# Patient Record
Sex: FEMALE | Race: WHITE | NOT HISPANIC OR LATINO | Employment: UNEMPLOYED | URBAN - METROPOLITAN AREA
[De-identification: names, ages, dates, MRNs, and addresses within clinical notes are randomized per-mention and may not be internally consistent; named-entity substitution may affect disease eponyms.]

---

## 2023-05-09 PROBLEM — B02.9 SHINGLES: Status: RESOLVED | Noted: 2023-05-09 | Resolved: 2023-05-09

## 2023-05-09 PROBLEM — G47.00 INSOMNIA: Status: ACTIVE | Noted: 2023-05-09

## 2023-05-09 PROBLEM — B02.9 HERPES ZOSTER WITHOUT COMPLICATION: Status: RESOLVED | Noted: 2023-05-09 | Resolved: 2023-05-09

## 2023-05-09 PROBLEM — N39.0 RECURRENT POSTCOITAL URINARY TRACT INFECTION: Status: ACTIVE | Noted: 2023-05-09

## 2023-05-09 PROBLEM — D72.819 LEUKOPENIA: Status: ACTIVE | Noted: 2023-05-09

## 2023-05-09 PROBLEM — N83.209 OVARIAN CYST: Status: ACTIVE | Noted: 2023-05-09

## 2023-05-09 PROBLEM — J34.89 NASAL OBSTRUCTION: Status: ACTIVE | Noted: 2023-05-09

## 2023-05-09 PROBLEM — E03.9 HYPOTHYROIDISM: Status: ACTIVE | Noted: 2023-05-09

## 2023-05-09 PROBLEM — N63.0 LUMP OR MASS IN BREAST: Status: ACTIVE | Noted: 2023-05-09

## 2023-05-09 PROBLEM — E78.5 HYPERLIPIDEMIA: Status: ACTIVE | Noted: 2023-05-09

## 2023-05-09 PROBLEM — B00.1 COLD SORE: Status: ACTIVE | Noted: 2023-05-09

## 2023-05-09 PROBLEM — J35.8 TONSIL STONE: Status: ACTIVE | Noted: 2023-05-09

## 2023-05-09 PROBLEM — J34.2 DEVIATED SEPTUM: Status: ACTIVE | Noted: 2023-05-09

## 2023-05-09 PROBLEM — D64.9 ANEMIA: Status: ACTIVE | Noted: 2023-05-09

## 2023-05-09 PROBLEM — M25.579 ANKLE JOINT PAIN: Status: ACTIVE | Noted: 2023-05-09

## 2023-05-09 PROBLEM — J32.9 CHRONIC SINUSITIS: Status: ACTIVE | Noted: 2023-05-09

## 2023-05-09 RX ORDER — FLUTICASONE PROPIONATE 50 MCG
SPRAY, SUSPENSION (ML) NASAL
COMMUNITY
Start: 2018-03-21

## 2023-05-09 RX ORDER — ZOLPIDEM TARTRATE 5 MG/1
1 TABLET ORAL NIGHTLY PRN
COMMUNITY
Start: 2021-01-20 | End: 2023-05-10 | Stop reason: SDUPTHER

## 2023-05-10 ENCOUNTER — OFFICE VISIT (OUTPATIENT)
Dept: PRIMARY CARE | Facility: CLINIC | Age: 63
End: 2023-05-10
Payer: COMMERCIAL

## 2023-05-10 VITALS
WEIGHT: 135 LBS | SYSTOLIC BLOOD PRESSURE: 130 MMHG | HEART RATE: 70 BPM | RESPIRATION RATE: 14 BRPM | BODY MASS INDEX: 21.19 KG/M2 | DIASTOLIC BLOOD PRESSURE: 70 MMHG | HEIGHT: 67 IN | TEMPERATURE: 97.8 F

## 2023-05-10 DIAGNOSIS — R91.1 LUNG NODULE: ICD-10-CM

## 2023-05-10 DIAGNOSIS — G47.00 INSOMNIA, UNSPECIFIED TYPE: ICD-10-CM

## 2023-05-10 DIAGNOSIS — Z00.00 PERIODIC HEALTH ASSESSMENT, GENERAL SCREENING, ADULT: Primary | ICD-10-CM

## 2023-05-10 DIAGNOSIS — E03.9 HYPOTHYROIDISM, UNSPECIFIED TYPE: ICD-10-CM

## 2023-05-10 DIAGNOSIS — E78.5 HYPERLIPIDEMIA, UNSPECIFIED HYPERLIPIDEMIA TYPE: ICD-10-CM

## 2023-05-10 PROBLEM — D72.819 LEUKOPENIA: Status: RESOLVED | Noted: 2023-05-09 | Resolved: 2023-05-10

## 2023-05-10 PROCEDURE — 99396 PREV VISIT EST AGE 40-64: CPT | Performed by: INTERNAL MEDICINE

## 2023-05-10 PROCEDURE — 1036F TOBACCO NON-USER: CPT | Performed by: INTERNAL MEDICINE

## 2023-05-10 RX ORDER — ZOLPIDEM TARTRATE 5 MG/1
5 TABLET ORAL NIGHTLY PRN
Qty: 5 TABLET | Refills: 0 | Status: SHIPPED | OUTPATIENT
Start: 2023-05-10 | End: 2023-07-21 | Stop reason: SDUPTHER

## 2023-05-10 ASSESSMENT — ENCOUNTER SYMPTOMS
PALPITATIONS: 0
SHORTNESS OF BREATH: 0
COUGH: 0
CONSTIPATION: 0
WHEEZING: 0
ABDOMINAL PAIN: 0

## 2023-05-10 NOTE — PROGRESS NOTES
"Subjective   Patient ID: Savanna Shah is a 63 y.o. female who presents for Annual Exam (Physical).  She is super active.  No issues with CP,SOB or dizzy spells.  She does follow with Gyn.  She states she is up to date with colonoscopy - through [facility].  No issues or changes with bowel or bladder habits.    We reviewed her previous CT scans as well.  She has never been a smoker and has never been exposed to nay known carcinogens.      Review of Systems   Respiratory:  Negative for cough, shortness of breath and wheezing.    Cardiovascular:  Negative for chest pain and palpitations.   Gastrointestinal:  Negative for abdominal pain and constipation.       Objective   /70 (BP Location: Left arm, Patient Position: Sitting, BP Cuff Size: Adult)   Pulse 70   Temp 36.6 °C (97.8 °F) (Tympanic)   Resp 14   Ht 1.702 m (5' 7\")   Wt 61.2 kg (135 lb)   BMI 21.14 kg/m²     Physical Exam    Assessment/Plan   Problem List Items Addressed This Visit          Nervous    Insomnia    Relevant Medications    zolpidem (Ambien) 5 mg tablet       Endocrine/Metabolic    Hypothyroidism       Other    Hyperlipidemia     Other Visit Diagnoses       Periodic health assessment, general screening, adult    -  Primary    Relevant Orders    CBC    Comprehensive Metabolic Panel    Lipid Panel    Thyroid Stimulating Hormone    Vitamin D, Total    Lung nodule            I personally reviewed the OARRS report for this patient.  The report has been automatically uploaded into the EMR.  I have considered the risk of abuse, dependence, addiction and diversion.     She only uses the ambien when traveling - this is not a regularly used substance.    Physical exam is unremarkable.  We reviewed and discussed all the above.  We discussed current medications as well as most recent test results.  We discussed the importance and benefits of a healthy diet that is both low in sugars and low in saturated fats.  We reviewed and discussed the " benefits of regular physical exercise. We also discussed the importance of stress management and good sleep hygiene which she says she has no issues with.    We will continue to work on lifestyle improvements and follow-up in 6 to 12 months, sooner if any issues should arise.

## 2023-07-21 DIAGNOSIS — G47.00 INSOMNIA, UNSPECIFIED TYPE: ICD-10-CM

## 2023-07-21 RX ORDER — ZOLPIDEM TARTRATE 5 MG/1
5 TABLET ORAL NIGHTLY PRN
Qty: 30 TABLET | Refills: 0 | Status: SHIPPED | OUTPATIENT
Start: 2023-07-21 | End: 2023-12-26 | Stop reason: SDUPTHER

## 2023-10-23 ENCOUNTER — APPOINTMENT (OUTPATIENT)
Dept: CARDIOLOGY | Facility: CLINIC | Age: 63
End: 2023-10-23
Payer: COMMERCIAL

## 2023-12-26 DIAGNOSIS — G47.00 INSOMNIA, UNSPECIFIED TYPE: ICD-10-CM

## 2023-12-26 RX ORDER — ZOLPIDEM TARTRATE 5 MG/1
5 TABLET ORAL NIGHTLY PRN
Qty: 30 TABLET | Refills: 0 | OUTPATIENT
Start: 2023-12-26 | End: 2024-01-25

## 2023-12-26 NOTE — TELEPHONE ENCOUNTER
----- Message from Mary Castillo CMA sent at 12/26/2023  8:20 AM EST -----  Regarding: FW: Ambient   Contact: [phone]    ----- Message -----  From: Savanna Shah  Sent: 12/26/2023   7:46 AM EST  To: [id number]  Subject: Ambient                                          I need a 30 day refill on ambient. I only use when traveling for business. Which has been heavy lately. Thank you.

## 2023-12-26 NOTE — TELEPHONE ENCOUNTER
----- Message from Mary Castillo CMA sent at 12/26/2023  8:20 AM EST -----  Regarding: FW: Ambient   Contact: [phone]    ----- Message -----  From: Savanna Shah  Sent: 12/26/2023   7:46 AM EST  To: [id number]  Subject: Ambient                                          I need a 30 day refill on ambient. I only use when traveling for business. Which has been heavy lately. Thank you.

## 2023-12-26 NOTE — TELEPHONE ENCOUNTER
Last seen 5/2023, Sent a message for patient to call the office to schedule an appointment. She is aware that you are out of the office.

## 2023-12-28 RX ORDER — ZOLPIDEM TARTRATE 5 MG/1
5 TABLET ORAL NIGHTLY PRN
Qty: 30 TABLET | Refills: 3 | Status: SHIPPED | OUTPATIENT
Start: 2023-12-28 | End: 2024-01-15 | Stop reason: SDUPTHER

## 2024-01-15 ENCOUNTER — TELEPHONE (OUTPATIENT)
Dept: PRIMARY CARE | Facility: CLINIC | Age: 64
End: 2024-01-15
Payer: COMMERCIAL

## 2024-01-15 DIAGNOSIS — G47.00 INSOMNIA, UNSPECIFIED TYPE: ICD-10-CM

## 2024-01-15 RX ORDER — ZOLPIDEM TARTRATE 5 MG/1
5 TABLET ORAL NIGHTLY PRN
Qty: 30 TABLET | Refills: 3 | Status: SHIPPED | OUTPATIENT
Start: 2024-01-15 | End: 2024-05-14

## 2024-01-15 NOTE — TELEPHONE ENCOUNTER
Pt left  requesting Ambien rf.  Stated she has called twice for rf to [facility].  Was told by [facility] rf was denied. Requesting call back.

## 2024-02-06 DIAGNOSIS — M65.312 TRIGGER FINGER OF LEFT THUMB: Primary | ICD-10-CM

## 2024-03-01 RX ORDER — BISMUTH SUBSALICYLATE 262 MG
1 TABLET,CHEWABLE ORAL DAILY
COMMUNITY

## 2024-03-01 RX ORDER — CHOLECALCIFEROL (VITAMIN D3) 25 MCG
1000 TABLET ORAL DAILY
COMMUNITY

## 2024-03-01 ASSESSMENT — DUKE ACTIVITY SCORE INDEX (DASI)
CAN YOU DO MODERATE WORK AROUND THE HOUSE LIKE VACUUMING, SWEEPING FLOORS OR CARRYING GROCERIES: YES
CAN YOU WALK INDOORS, SUCH AS AROUND YOUR HOUSE: YES
CAN YOU PARTICIPATE IN MODERATE RECREATIONAL ACTIVITIES LIKE GOLF, BOWLING, DANCING, DOUBLES TENNIS OR THROWING A BASEBALL OR FOOTBALL: YES
CAN YOU PARTICIPATE IN STRENOUS SPORTS LIKE SWIMMING, SINGLES TENNIS, FOOTBALL, BASKETBALL, OR SKIING: YES
CAN YOU CLIMB A FLIGHT OF STAIRS OR WALK UP A HILL: YES
CAN YOU WALK A BLOCK OR TWO ON LEVEL GROUND: YES
CAN YOU DO HEAVY WORK AROUND THE HOUSE LIKE SCRUBBING FLOORS OR LIFTING AND MOVING HEAVY FURNITURE: YES
CAN YOU DO YARD WORK LIKE RAKING LEAVES, WEEDING OR PUSHING A MOWER: YES
CAN YOU DO LIGHT WORK AROUND THE HOUSE LIKE DUSTING OR WASHING DISHES: YES
CAN YOU HAVE SEXUAL RELATIONS: YES
CAN YOU TAKE CARE OF YOURSELF (EAT, DRESS, BATHE, OR USE TOILET): YES
TOTAL_SCORE: 58.2
DASI METS SCORE: 9.9
CAN YOU RUN A SHORT DISTANCE: YES

## 2024-03-01 ASSESSMENT — LIFESTYLE VARIABLES: SMOKING_STATUS: NONSMOKER

## 2024-03-01 ASSESSMENT — CHADS2 SCORE
CHADS2 SCORE: 0
AGE GREATER THAN OR EQUAL TO 75: NO
DIABETES: NO
CHF: NO
PRIOR STROKE OR TIA OR THROMBOEMBOLISM: NO
HYPERTENSION: NO

## 2024-03-01 ASSESSMENT — ACTIVITIES OF DAILY LIVING (ADL): ADL_SCORE: 0

## 2024-03-04 ENCOUNTER — PRE-ADMISSION TESTING (OUTPATIENT)
Dept: PREADMISSION TESTING | Facility: HOSPITAL | Age: 64
End: 2024-03-04
Payer: MEDICARE

## 2024-03-04 VITALS
DIASTOLIC BLOOD PRESSURE: 77 MMHG | OXYGEN SATURATION: 98 % | TEMPERATURE: 97.3 F | HEART RATE: 55 BPM | WEIGHT: 136.91 LBS | BODY MASS INDEX: 22 KG/M2 | HEIGHT: 66 IN | RESPIRATION RATE: 16 BRPM | SYSTOLIC BLOOD PRESSURE: 113 MMHG

## 2024-03-04 DIAGNOSIS — M65.312 TRIGGER FINGER OF LEFT THUMB: ICD-10-CM

## 2024-03-04 DIAGNOSIS — Z01.818 PRE-OP TESTING: Primary | ICD-10-CM

## 2024-03-04 PROCEDURE — 99203 OFFICE O/P NEW LOW 30 MIN: CPT | Performed by: NURSE PRACTITIONER

## 2024-03-04 PROCEDURE — 93010 ELECTROCARDIOGRAM REPORT: CPT | Performed by: INTERNAL MEDICINE

## 2024-03-04 PROCEDURE — 93005 ELECTROCARDIOGRAM TRACING: CPT

## 2024-03-04 ASSESSMENT — PAIN SCALES - GENERAL: PAINLEVEL_OUTOF10: 0 - NO PAIN

## 2024-03-04 ASSESSMENT — PAIN - FUNCTIONAL ASSESSMENT: PAIN_FUNCTIONAL_ASSESSMENT: 0-10

## 2024-03-04 NOTE — CPM/PAT H&P
"CPM/PAT Evaluation       Name: Savanna Shah (Savanna Shah)  /Age: 1960/63 y.o.     In-Person       Chief Complaint: left thumb pains    HPI    LH is a 64 yo female who has had left thumb pains with limited ROM- \"locking\" discomforts are often. She wears a splint for comfort at times. Denies any recent steroid injections. It has been determined to be left thumb trigger finger so subsequently scheduled for left thumb soft tissue release. Skin intact to surgical hand. Otherwise denies any recent illness, fever/chills, chest pains or shortness of breath.     Past Medical History:   Diagnosis Date    Insect bite (nonvenomous), unspecified lower leg, initial encounter (CODE) 10/09/2019    Insect bite of lower leg, unspecified laterality, initial encounter    Melanoma (CMS/HCC)     Trigger finger      PCP: Dr. Watson    Past Surgical History:   Procedure Laterality Date    OTHER SURGICAL HISTORY  2022    Dilation and curettage    OTHER SURGICAL HISTORY  2022    Complete colonoscopy    THUMB ARTHROSCOPY Right        Patient  reports being sexually active.    Family History   Problem Relation Name Age of Onset    Heart attack Father      Diabetes Sister      Other (CABG) Brother         Allergies   Allergen Reactions    Penicillins Unknown       Prior to Admission medications    Medication Sig Start Date End Date Taking? Authorizing Provider   cholecalciferol (Vitamin D3) 25 MCG (1000 UT) tablet Take 1 tablet (1,000 Units) by mouth once daily.    Historical Provider, MD   fluticasone (Flonase) 50 mcg/actuation nasal spray Administer into affected nostril(s). 3/21/18   Historical Provider, MD   mecobalamin (B12 ACTIVE ORAL) Take 1 capsule by mouth once daily.    Historical Provider, MD   multivitamin tablet Take 1 tablet by mouth once daily.    Historical Provider, MD   zolpidem (Ambien) 5 mg tablet Take 1 tablet (5 mg) by mouth as needed at bedtime for sleep. 1/15/24 5/14/24  Gregg Watson, " DO        PAT ROS   Constitutional: Negative for fever, chills, or sweats   ENMT: Negative for nasal discharge, congestion, ear pain, mouth pain, throat pain   Respiratory: Negative for cough, wheezing, shortness of breath   Cardiac: Negative for chest pain, dyspnea on exertion, palpitations   Gastrointestinal: Negative for nausea, vomiting, diarrhea, constipation, abdominal pain  Genitourinary: Negative for dysuria, flank pain, frequency, hematuria     Musculoskeletal: Positive for decreased ROM, pain, swelling, weakness in left thumb    Neurological: Negative for dizziness, confusion, headache  Psychiatric: Negative for mood changes   Skin: Negative for itching, rash, ulcer    Hematologic/Lymph: Negative for bruising, easy bleeding  Allergic/Immunologic: Negative itching, sneezing, swelling      Physical Exam  HENT:      Head: Normocephalic.      Mouth/Throat:      Mouth: Mucous membranes are moist.   Eyes:      Extraocular Movements: Extraocular movements intact.   Cardiovascular:      Rate and Rhythm: Normal rate and regular rhythm.   Pulmonary:      Effort: Pulmonary effort is normal.      Breath sounds: Normal breath sounds.   Abdominal:      General: Abdomen is flat.      Palpations: Abdomen is soft.   Musculoskeletal:         General: Normal range of motion.      Cervical back: Normal range of motion.      Comments: Left thumb limited ROM   Skin:     General: Skin is warm and dry.   Neurological:      General: No focal deficit present.      Mental Status: She is alert.   Psychiatric:         Mood and Affect: Mood normal.        PAT AIRWAY:   Airway:     Neck ROM::  Full  normal      Anesthesia:  Patient denies any anesthesia complications.     Visit Vitals  /77   Pulse 55   Temp 36.3 °C (97.3 °F) (Temporal)   Resp 16       DASI Risk Score      Flowsheet Row Most Recent Value   DASI SCORE 58.2   METS Score (Will be calculated only when all the questions are answered) 9.9          Caprini DVT Assessment     No data to display       Modified Frailty Index      Flowsheet Row Most Recent Value   Modified Frailty Index Calculator 0          CHADS2 Stroke Risk  Current as of 2 minutes ago        N/A 3 - 100%: High Risk   2 - 3%: Medium Risk   0 - 2%: Low Risk     Last Change: N/A          This score determines the patient's risk of having a stroke if the patient has atrial fibrillation.        This score is not applicable to this patient. Components are not calculated.          Revised Cardiac Risk Index    No data to display       Apfel Simplified Score    No data to display       Risk Analysis Index Results This Encounter         3/1/2024  1229             RAE Cancer History: Patient does not indicate history of cancer    Total Risk Analysis Index Score Without Cancer: 18    Total Risk Analysis Index Score: 18          Stop Bang Score      Flowsheet Row Most Recent Value   Do you snore loudly? 0   Do you often feel tired or fatigued after your sleep? 0   Has anyone ever observed you stop breathing in your sleep? 0   Do you have or are you being treated for high blood pressure? 0   Recent BMI (Calculated) 21.1   Is BMI greater than 35 kg/m2? 0=No   Age older than 50 years old? 1=Yes   Is your neck circumference greater than 17 inches (Male) or 16 inches (Female)? 0   Gender - Male 0=No   STOP-BANG Total Score 1            Assessment and Plan:     64 yo female scheduled for left upper extremity thumb soft tissue release on 3/18/2024 with Dr. Sánchez. EKG shows sinus bradycardia with v rate of 51 bpm. Otherwise no further orders indicated.     See risk scores as previously documented.

## 2024-03-04 NOTE — PREPROCEDURE INSTRUCTIONS
Medication List            Accurate as of March 4, 2024 11:24 AM. Always use your most recent med list.                B12 ACTIVE ORAL  Medication Adjustments for Surgery: Stop 7 days before surgery     fluticasone 50 mcg/actuation nasal spray  Commonly known as: Flonase  Medication Adjustments for Surgery: Other (Comment)  Notes to patient: May use the morning of surgery if needed     multivitamin tablet  Medication Adjustments for Surgery: Stop 7 days before surgery     Vitamin D3 25 MCG (1000 UT) tablet  Generic drug: cholecalciferol  Medication Adjustments for Surgery: Stop 7 days before surgery     zolpidem 5 mg tablet  Commonly known as: Ambien  Take 1 tablet (5 mg) by mouth as needed at bedtime for sleep.  Medication Adjustments for Surgery: Continue until night before surgery                  PRE-OPERATIVE INSTRUCTIONS    You will receive notification one business day prior to your procedure to confirm your arrival time. It is important that you answer your phone and/or check your messages during this time. If you do not hear from the surgery center by 5 pm. the day before your procedure, please call [phone].     Please enter the building through the Outpatient entrance and take the elevator off the lobby to the 2nd floor then check in at the Outpatient Surgery desk on the 2nd floor.    INSTRUCTIONS:  Talk to your surgeon for instructions if you should stop your aspirin, blood thinner, or diabetes medicines.  DO NOT take any multivitamins or over the counter supplements for 7-10 days before surgery.  If not being admitted, you must have an adult immediately available to drive you home after surgery. We also highly recommend you have someone stay with you for the entire day and night of your surgery.  For children having surgery, a parent or legal guardian must accompany them to the surgery center. If this is not possible, please call [phone] to make additional arrangements.  For adults who are  unable to consent or make medical decisions for themselves, a legal guardian or Power of  must accompany them to the surgery center. If this is not possible, please call [phone] to make additional arrangements.  Wear comfortable, loose fitting clothing.  All jewelry and piercings must be removed. If you are unable to remove an item or have a dermal piercing, please be sure to tell the nurse when you arrive for surgery.  Nail polish and make-up must be removed.  Avoid smoking or consuming alcohol for 24 hours before surgery.  To help prevent infection, please take a shower/bath and wash your hair the night before and/or morning of surgery (or follow other specific bathing instructions provided).    Preoperative Fasting Guidelines    Why must I stop eating and drinking near surgery time?  With sedation, food or liquid in your stomach can enter your lungs causing serious complications  Increases nausea and vomiting    When do I need to stop eating and drinking before my surgery?  Do not eat any solid food after midnight the night before your surgery/procedure.  You may have up to TEN ounces of clear liquid until TWO hours before your instructed arrival time to the hospital.  This includes water, black tea/coffee, (no milk or cream) apple juice, and electrolyte drinks (Gatorade).   You may chew gum until TWO hours before your surgery/procedure    If you have any questions or concerns, please call Pre-Admission Testing at [phone].       Home Preoperative Antibacterial Shower with Chlorhexidine gluconate (CHG)     What is a home preoperative antibacterial shower?  This shower is a way of cleaning the skin with a germ killing solution before surgery. The solution contains chlorhexidine gluconate, commonly known as CHG. CHG is a skin cleanser with germ killing ability. Let your doctor know if you are allergic to chlorhexidine.    Why do I need to take a preoperative antibacterial shower?  Skin is not  sterile. It is best to try to make your skin as free of germs as possible before surgery. Proper cleansing with a germ killing soap before surgery can lower the number of germs on your skin. This helps to reduce the risk of infection at the surgical site. Following the instructions listed below will help you prepare your skin for surgery.    How do I use the solution?  Begin using your CHG soap the night before and again the morning of your procedure.   Do not shave the day before or day of surgery.  Remove all jewelry until after surgery. Take off rings and take out all body-piercing jewelry.  Wash your face and hair with normal soap and shampoo before you use the CHG soap.  Apply the CHG solution to a clean wet washcloth. Move away from the water to avoid premature rinsing of the CHG soap as you are applying. Firmly lather your entire body from the neck down. Do not use CHG on your face, eyes, ears, or genitals.   Pay special attention to the area where your incisions will be located.  Do not scrub your skin too hard.  It is important to allow the CHG soap to sit on your skin for 3-5 minutes.  Rinse the solution off your body completely. Do not wash with your normal soap after the CHG soap solution.  Pat yourself dry with a clean, soft towel.  Do not apply powders, lotions or deodorants as these might block how the CHG soap works.   Dress in clean clothing.  Be sure to sleep with clean, freshly laundered sheets.  Be aware that CHG can cause stains on fabric. Rinse your washcloth and other linens that have contact with CHG completely. Use only non-chlorine detergents to launder the items used.

## 2024-03-07 LAB
ATRIAL RATE: 51 BPM
P AXIS: 70 DEGREES
P OFFSET: 189 MS
P ONSET: 136 MS
PR INTERVAL: 174 MS
Q ONSET: 223 MS
QRS COUNT: 9 BEATS
QRS DURATION: 88 MS
QT INTERVAL: 426 MS
QTC CALCULATION(BAZETT): 392 MS
QTC FREDERICIA: 403 MS
R AXIS: 49 DEGREES
T AXIS: 55 DEGREES
T OFFSET: 436 MS
VENTRICULAR RATE: 51 BPM

## 2024-03-18 ENCOUNTER — ANESTHESIA (OUTPATIENT)
Dept: OPERATING ROOM | Facility: HOSPITAL | Age: 64
End: 2024-03-18
Payer: MEDICARE

## 2024-03-18 ENCOUNTER — ANESTHESIA EVENT (OUTPATIENT)
Dept: OPERATING ROOM | Facility: HOSPITAL | Age: 64
End: 2024-03-18
Payer: MEDICARE

## 2024-03-18 ENCOUNTER — HOSPITAL ENCOUNTER (OUTPATIENT)
Facility: HOSPITAL | Age: 64
Setting detail: OUTPATIENT SURGERY
Discharge: HOME | End: 2024-03-18
Payer: MEDICARE

## 2024-03-18 VITALS
HEART RATE: 55 BPM | WEIGHT: 133 LBS | BODY MASS INDEX: 21.38 KG/M2 | DIASTOLIC BLOOD PRESSURE: 75 MMHG | SYSTOLIC BLOOD PRESSURE: 140 MMHG | OXYGEN SATURATION: 98 % | TEMPERATURE: 96.8 F | RESPIRATION RATE: 16 BRPM | HEIGHT: 66 IN

## 2024-03-18 DIAGNOSIS — Z79.2 PROPHYLACTIC ANTIBIOTIC: Primary | ICD-10-CM

## 2024-03-18 DIAGNOSIS — G89.18 POST-OPERATIVE PAIN: ICD-10-CM

## 2024-03-18 DIAGNOSIS — M65.312 TRIGGER FINGER OF LEFT THUMB: ICD-10-CM

## 2024-03-18 PROCEDURE — 2500000004 HC RX 250 GENERAL PHARMACY W/ HCPCS (ALT 636 FOR OP/ED): Performed by: ANESTHESIOLOGIST ASSISTANT

## 2024-03-18 PROCEDURE — 3600000003 HC OR TIME - INITIAL BASE CHARGE - PROCEDURE LEVEL THREE

## 2024-03-18 PROCEDURE — 2500000001 HC RX 250 WO HCPCS SELF ADMINISTERED DRUGS (ALT 637 FOR MEDICARE OP)

## 2024-03-18 PROCEDURE — 2500000004 HC RX 250 GENERAL PHARMACY W/ HCPCS (ALT 636 FOR OP/ED)

## 2024-03-18 PROCEDURE — 7100000009 HC PHASE TWO TIME - INITIAL BASE CHARGE

## 2024-03-18 PROCEDURE — A26055 PR INCISE FINGER TENDON SHEATH: Performed by: ANESTHESIOLOGY

## 2024-03-18 PROCEDURE — 3700000001 HC GENERAL ANESTHESIA TIME - INITIAL BASE CHARGE

## 2024-03-18 PROCEDURE — 2500000005 HC RX 250 GENERAL PHARMACY W/O HCPCS

## 2024-03-18 PROCEDURE — 88304 TISSUE EXAM BY PATHOLOGIST: CPT | Mod: TC,STJLAB

## 2024-03-18 PROCEDURE — 7100000010 HC PHASE TWO TIME - EACH INCREMENTAL 1 MINUTE

## 2024-03-18 PROCEDURE — 2720000007 HC OR 272 NO HCPCS

## 2024-03-18 PROCEDURE — 88304 TISSUE EXAM BY PATHOLOGIST: CPT | Performed by: PATHOLOGY

## 2024-03-18 PROCEDURE — 3600000008 HC OR TIME - EACH INCREMENTAL 1 MINUTE - PROCEDURE LEVEL THREE

## 2024-03-18 PROCEDURE — A26055 PR INCISE FINGER TENDON SHEATH: Performed by: ANESTHESIOLOGIST ASSISTANT

## 2024-03-18 PROCEDURE — 3700000002 HC GENERAL ANESTHESIA TIME - EACH INCREMENTAL 1 MINUTE

## 2024-03-18 PROCEDURE — 2500000005 HC RX 250 GENERAL PHARMACY W/O HCPCS: Performed by: ANESTHESIOLOGIST ASSISTANT

## 2024-03-18 RX ORDER — PROPOFOL 10 MG/ML
INJECTION, EMULSION INTRAVENOUS AS NEEDED
Status: DISCONTINUED | OUTPATIENT
Start: 2024-03-18 | End: 2024-03-18

## 2024-03-18 RX ORDER — DOXYCYCLINE 100 MG/1
100 CAPSULE ORAL 2 TIMES DAILY
Qty: 20 CAPSULE | Refills: 0 | Status: SHIPPED | OUTPATIENT
Start: 2024-03-18 | End: 2024-03-28

## 2024-03-18 RX ORDER — OXYCODONE AND ACETAMINOPHEN 5; 325 MG/1; MG/1
1 TABLET ORAL EVERY 6 HOURS PRN
Qty: 12 TABLET | Refills: 0 | Status: SHIPPED | OUTPATIENT
Start: 2024-03-18 | End: 2024-03-21

## 2024-03-18 RX ORDER — CLINDAMYCIN PHOSPHATE 900 MG/50ML
INJECTION, SOLUTION INTRAVENOUS AS NEEDED
Status: DISCONTINUED | OUTPATIENT
Start: 2024-03-18 | End: 2024-03-18

## 2024-03-18 RX ORDER — LIDOCAINE HYDROCHLORIDE 20 MG/ML
INJECTION, SOLUTION INFILTRATION; PERINEURAL AS NEEDED
Status: DISCONTINUED | OUTPATIENT
Start: 2024-03-18 | End: 2024-03-18 | Stop reason: HOSPADM

## 2024-03-18 RX ORDER — CLINDAMYCIN PHOSPHATE 600 MG/50ML
600 INJECTION, SOLUTION INTRAVENOUS ONCE
Status: DISCONTINUED | OUTPATIENT
Start: 2024-03-18 | End: 2024-03-18 | Stop reason: HOSPADM

## 2024-03-18 RX ORDER — MIDAZOLAM HYDROCHLORIDE 1 MG/ML
INJECTION, SOLUTION INTRAMUSCULAR; INTRAVENOUS AS NEEDED
Status: DISCONTINUED | OUTPATIENT
Start: 2024-03-18 | End: 2024-03-18

## 2024-03-18 RX ORDER — SODIUM CHLORIDE, SODIUM LACTATE, POTASSIUM CHLORIDE, CALCIUM CHLORIDE 600; 310; 30; 20 MG/100ML; MG/100ML; MG/100ML; MG/100ML
INJECTION, SOLUTION INTRAVENOUS CONTINUOUS PRN
Status: DISCONTINUED | OUTPATIENT
Start: 2024-03-18 | End: 2024-03-18

## 2024-03-18 RX ORDER — BACITRACIN ZINC 500 UNIT/G
OINTMENT IN PACKET (EA) TOPICAL AS NEEDED
Status: DISCONTINUED | OUTPATIENT
Start: 2024-03-18 | End: 2024-03-18 | Stop reason: HOSPADM

## 2024-03-18 RX ORDER — LIDOCAINE HYDROCHLORIDE 5 MG/ML
INJECTION, SOLUTION INFILTRATION; INTRAVENOUS AS NEEDED
Status: DISCONTINUED | OUTPATIENT
Start: 2024-03-18 | End: 2024-03-18

## 2024-03-18 RX ORDER — BUPIVACAINE HYDROCHLORIDE 2.5 MG/ML
INJECTION, SOLUTION EPIDURAL; INFILTRATION; INTRACAUDAL AS NEEDED
Status: DISCONTINUED | OUTPATIENT
Start: 2024-03-18 | End: 2024-03-18 | Stop reason: HOSPADM

## 2024-03-18 RX ADMIN — CLINDAMYCIN IN 5 PERCENT DEXTROSE 600 MG: 18 INJECTION, SOLUTION INTRAVENOUS at 07:39

## 2024-03-18 RX ADMIN — PROPOFOL 500 MG: 10 INJECTION, EMULSION INTRAVENOUS at 07:44

## 2024-03-18 RX ADMIN — SODIUM CHLORIDE, POTASSIUM CHLORIDE, SODIUM LACTATE AND CALCIUM CHLORIDE: 600; 310; 30; 20 INJECTION, SOLUTION INTRAVENOUS at 07:34

## 2024-03-18 RX ADMIN — LIDOCAINE HYDROCHLORIDE 225 MG: 5 INJECTION, SOLUTION INFILTRATION; INTRAVENOUS at 07:42

## 2024-03-18 RX ADMIN — MIDAZOLAM 2 MG: 1 INJECTION INTRAMUSCULAR; INTRAVENOUS at 07:34

## 2024-03-18 SDOH — HEALTH STABILITY: MENTAL HEALTH: CURRENT SMOKER: 0

## 2024-03-18 ASSESSMENT — PAIN SCALES - GENERAL
PAIN_LEVEL: 0
PAINLEVEL_OUTOF10: 0 - NO PAIN
PAINLEVEL_OUTOF10: 0 - NO PAIN

## 2024-03-18 ASSESSMENT — COLUMBIA-SUICIDE SEVERITY RATING SCALE - C-SSRS
6. HAVE YOU EVER DONE ANYTHING, STARTED TO DO ANYTHING, OR PREPARED TO DO ANYTHING TO END YOUR LIFE?: NO
1. IN THE PAST MONTH, HAVE YOU WISHED YOU WERE DEAD OR WISHED YOU COULD GO TO SLEEP AND NOT WAKE UP?: NO
2. HAVE YOU ACTUALLY HAD ANY THOUGHTS OF KILLING YOURSELF?: NO

## 2024-03-18 ASSESSMENT — PAIN - FUNCTIONAL ASSESSMENT
PAIN_FUNCTIONAL_ASSESSMENT: 0-10
PAIN_FUNCTIONAL_ASSESSMENT: 0-10

## 2024-03-18 NOTE — DISCHARGE INSTRUCTIONS
Post-Operative Instructions    These discharge instructions provide you with general information on caring for yourself after you leave the hospital. Your doctor may also give you specific instructions. If you have any problems or questions after discharge, please call Dr. Sánchez office [phone].  Home Going Instructions:  Take over-the-counter or prescription medications for pain as directed. Resume your usual medications at home.   Keep your hand raised (elevated) for 2-3 days on 4-5 pillows above the level of your heart as much possible even when sleeping. This keeps swelling down and helps with discomfort.  Gently move your fingers to prevent stiffness, DO NOT USE HAND. Straighten fingers to full extension. No gripping weights, grabbing, pushing, pulling, lifting or carrying.  Use hand for very minimal activity. Ex: Buttons, Zippers.  When showering cover hand with a plastic bag to keep the dressing clean, dry and intact.   DO NOT CHANGE DRESSING, Dr. Sánchez will remove dressing in his office during the follow up visit in 3-4 days.  Call your Doctor at his office if:  You develop severe pain not relieved by medications.  You develop bleeding from your surgical site.  You have an oral temperature about 101°F.  You develop redness or swelling of the surgical site.  SEEK IMMEDIATE MEDICAL CARE IF: You have chest pain, difficulty breathy, and/or if you develop a reaction or side effects to medication given.  For Your Safety since you were given sedation/anesthesia and will be taking pain medication:  Someone should stay with you for 24 hours.  Change positions slowly.

## 2024-03-18 NOTE — ANESTHESIA PREPROCEDURE EVALUATION
Patient: Savanna Shah    Procedure Information       Date/Time: 03/18/24 0730    Procedure: RELEASE,SOFT TISSUE,UPPER EXTREMITY-THUMB (Left) - 60 min    Location: [facility] OR  / [facility] OR    Surgeons: Gunner Sánchez MD            Relevant Problems   Cardiovascular   (+) Hyperlipidemia      Endocrine   (+) Hypothyroidism      /Renal   (+) Recurrent postcoital urinary tract infection      Hematology   (+) Anemia      Infectious Disease   (+) Cold sore   (+) Recurrent postcoital urinary tract infection      Other   (+) Tonsil stone       Clinical information reviewed:   Tobacco  Allergies  Meds   Med Hx  Surg Hx   Fam Hx  Soc Hx        NPO Detail:  NPO/Void Status  Carbohydrate Drink Given Prior to Surgery? : N  Date of Last Liquid: 03/18/24  Time of Last Liquid: 0430 (water)  Date of Last Solid: 03/17/24  Time of Last Solid: 2000  Last Intake Type: Clear fluids  Time of Last Void: 0615         Physical Exam    Airway  Mallampati: I  TM distance: >3 FB  Neck ROM: full     Cardiovascular - normal exam  Rhythm: regular  Rate: normal     Dental - normal exam     Pulmonary - normal exam  Breath sounds clear to auscultation     Abdominal   Abdomen: soft  Bowel sounds: normal           Anesthesia Plan    History of general anesthesia?: yes  History of complications of general anesthesia?: no    ASA 2     regional and MAC     The patient is not a current smoker.  Patient was previously instructed to abstain from smoking on day of procedure.  Patient did not smoke on day of procedure.  Education provided regarding risk of obstructive sleep apnea.  Anesthetic plan and risks discussed with patient.  Use of blood products discussed with patient who.    Plan discussed with [facility].

## 2024-03-18 NOTE — ANESTHESIA POSTPROCEDURE EVALUATION
Patient: Savanna Shah    Procedure Summary       Date: 03/18/24 Room / Location: [facility] OR 05 / [facility] OR    Anesthesia Start: 0734 Anesthesia Stop: 0830    Procedure: RELEASE,SOFT TISSUE,UPPER EXTREMITY-THUMB (Left) Diagnosis:       Trigger finger of left thumb      (Trigger finger of left thumb [M65.312])    Surgeons: Gunner Sánchez MD Responsible Provider: Ewa Khan MD    Anesthesia Type: MAC, regional ASA Status: 2            Anesthesia Type: MAC, regional    Vitals Value Taken Time   /76 03/18/24 0829   Temp 35.9 °C (96.6 °F) 03/18/24 0829   Pulse 65 03/18/24 0829   Resp 16 03/18/24 0829   SpO2 98 % 03/18/24 0829       Anesthesia Post Evaluation    Patient location during evaluation: PACU (phase 2)  Patient participation: complete - patient participated  Level of consciousness: awake and alert  Pain score: 0  Pain management: adequate  Airway patency: patent  Cardiovascular status: acceptable  Respiratory status: acceptable  Hydration status: acceptable  Postoperative Nausea and Vomiting: none    No notable events documented.

## 2024-03-18 NOTE — ANESTHESIA PROCEDURE NOTES
Peripheral IV  Date/Time: 3/18/2024 7:30 AM      Placement  Needle size: 22 G  Laterality: left  Location: hand

## 2024-03-18 NOTE — OP NOTE
RELEASE,SOFT TISSUE,UPPER EXTREMITY-THUMB (L) Operative Note     Date: 3/18/2024  OR Location: [facility] OR    Name: Savanna Shah, : 1960, Age: 64 y.o., MRN: [id number], Sex: female    Diagnosis  Pre-op Diagnosis     * Trigger finger of left thumb [M65.312] Post-op Diagnosis     * Trigger finger of left thumb [M65.312]     Procedures  RELEASE,SOFT TISSUE,UPPER EXTREMITY-THUMB  [address] - [place] TENDON SHEATH INCISION      Surgeons   Dr. Gunner Sánchez MD    Resident/Fellow/Other Assistant:  Jose Cosme    Procedure Summary  Anesthesia: Regional McKenney block with sedation  ASA: II  Anesthesia Staff: Anesthesiologist: Ewa Khan MD  C-AA: VANESSA Finley  Estimated Blood Loss: Less than 10 mL  Intra-op Medications:   Administrations occurring from 0730 to 0850 on 24:   Medication Name Total Dose   bupivacaine PF (Marcaine) 0.25 % (2.5 mg/mL) injection 4 mL   lidocaine (Xylocaine) 20 mg/mL (2 %) injection 4 mL   bacitracin ointment 1 Application              Anesthesia Record               Intraprocedure I/O Totals          Intake    LR infusion 400.00 mL    clindamycin in D5W 900 mg/50 mL 33.33 mL    Total Intake 433.33 mL          Specimen:   ID Type Source Tests Collected by Time   1 : 1) A-1 PULLEY, LEFT THUMB Tissue TENDON/TENDON SHEATH SURGICAL PATHOLOGY EXAM Gunner Sánchez MD 3/18/2024 0802        Staff:   Circulator: Jay Soto RN  Scrub Person: Julieta Steven; Sachin Alvarez           Indications: Savanna Shha is an 64 y.o. female who is having surgery for Trigger finger of left thumb [M65.312].  She is identified preoperatively with her .  Risk and benefits of surgical invention once again reviewed with her.  Risks include anesthesia, infection, bleeding, injury to adjacent structures, paralysis, paresthesias, dysfunction, deformity, scarring, recurrence, nonresolution of symptoms, possible need for further surgical interventions among others.  We have discussed her current clinical  situation and treatment options at length.  She demonstrates very active painful left thumb trigger thumb.  She understands all of our discussions.  She wished to proceed with surgical intervention.  The appropriate consent is obtained.  The active painful site of triggering in the left thumb in the palm over the MCP flexion crease is identified and marked preoperatively with a marking pen.  She received preoperative IV antibiotics.  SCDs are in place.  The preoperative safety checklist was performed.  She was then taken the operating placed in supine position on the operating room table.    The patient was seen in the preoperative area. The risks, benefits, complications, treatment options, non-operative alternatives, expected recovery and outcomes were discussed with the patient. The possibilities of reaction to medication, pulmonary aspiration, injury to surrounding structures, bleeding, recurrent infection, the need for additional procedures, failure to diagnose a condition, and creating a complication requiring transfusion or operation were discussed with the patient. The patient concurred with the proposed plan, giving informed consent.  The site of surgery was properly noted/marked if necessary per policy. The patient has been actively warmed in preoperative area. Preoperative antibiotics have been ordered and given within 1 hours of incision. Venous thrombosis prophylaxis have been ordered including bilateral sequential compression devices    Procedure Details:     PREOPERATIVE DIAGNOSIS:    Active painful left thumb trigger thumb.    POSTOPERATIVE DIAGNOSIS:    Active painful left thumb trigger thumb with thickened A1 pulley.    OPERATIVE PROCEDURE:    The patient underwent left thumb trigger thumb release of the A1  pulley in the palm.     ATTENDING SURGEON: Dr. Gunner Sánchez MD    ASSISTANT: Candace Lakhani    ANESTHESIA:    Oliver block with sedation.    ESTIMATED BLOOD LOSS:    Less than 10  cc.    CONDITION:    Stable.    COMPLICATIONS:    None.    SPECIMEN:    Sent to Pathology for further evaluation.    DETAILS OF THE PROCEDURE:    The patient was identified preoperatively with her .  Risks and benefits of surgical intervention were once again reviewed with her. Risks include anesthesia, infection, bleeding, injury to adjacent structures, paralysis, paresthesias, dysfunction, deformity, scarring, recurrence, nonresolution of symptoms, possible need for further surgical interventions among others.  She understands her current clinical situation.  She understands all of our treatment options.  She wished to proceed with surgical intervention.  The appropriate consent was obtained.  The left thumb was appropriately identified at the active site of triggering in the palm at the level of the MCP flexion crease and marked with the marking pen.  She received preoperative IV antibiotics.  SCDs were in place.  The appropriate preoperative safety checklist was performed.  She was then taken to the operating room, placed in supine position on operating room table.  The Anesthesia Department then appropriately performed Oliver block anesthetic to the left upper extremity.  This involved placement of dorsal left hand IV catheter, placement of proximal upper extremity tourniquet, Esmarch exsanguination left upper extremity, inflation of the proximal upper extremity tourniquet, and instillation of the anesthetic medication. Once this was appropriately performed by the Anesthesia Department, the left upper extremity was then cleaned, prepped, and draped in usual sterile fashion.  The entire procedure performed under loupe magnification.  The left hand was appropriately positioned on the hand table.  The appropriate time-out procedure was performed.  The transverse incision site in the flexion crease of the MCP joint level in the palm of the left thumb was appropriately marked with a marking pen.  The incision  was then made with a scalpel.  Subcutaneous tissue was dissected with tenotomy scissors.  The radial and ulnar neurovascular structures were appropriately identified and retracted with Ragnell retractors and maintained free from injury throughout the procedure.  They were briefly, gently, and meticulously dissected in both proximal and distal directions to allow appropriate mobilization and retraction.  A small Heiss retractor was employed to retract the skin and subcutaneous tissue.  The A1 pulley flexor tendon system of the left thumb was appropriately identified.  The A1 pulley clinically appeared very thickened and fibrotic.  This was then incised mid centrally and longitudinally with the scalpel.  The flexor pollicis longus tendon was further identified directly underneath.  Complete release of the A1 pulley was performed in longitudinal fashion both proximal and distal directions under direct vision with loupe magnification employing the scalpel and tenotomy scissors.  This was confirmed with the hemostat.  The thumb was placed through further passive range of motion.  No further triggering was identified.  A small section of the cut edge of the thickened fibrotic A1 pulley was then sharply transected with the sharp curved iris scissors, excised, passed off table to be sent to Pathology for further evaluation.  The flexor pollicis longus tendon was then retracted from its anatomic position with a hemostat.  The digit was placed through further passive range of motion.  The flexor pollicis longus tendon was further examined.  There were no other significant pathologic entities involving the flexor pollicis longus tendon.  No further triggering was identified.  The hemostat was then withdrawn and the flexor pollicis longus tendon was placed back into its anatomic position.  The digit was placed through further passive range of motion.  No further triggering was identified.  The operative site area was then  copiously irrigated with saline solution.  Excellent hemostasis was maintained with electrocautery. The operative site was then injected in local circumferential fashion with approximately 4 cc of 2% plain lidocaine solution for local anesthetic effect.  The proximal upper extremity tourniquet was then released.  Total tourniquet time was 26 minutes.  The entire hand and all 5 digits became pink, warm, and had a capillary refill of 1 to 2 seconds upon tourniquet release.  The operative site area was further examined and excellent hemostasis was maintained with electrocautery. Further copious irrigation with saline solution was performed. Excellent hemostasis was noted.  The skin edges were then reapproximated with few simple interrupted 4-0 nylon sutures. Operative site area was then further injected in local circumferential fashion with approximately 4 cc of 0.25% plain Marcaine solution for prolonged postoperative pain relief.  The left thumb and hand were further cleaned and dried.  Bacitracin, Xeroform, and the appropriate bulky protective sterile dressing was then fashioned and applied.  Distal tips of all 5 digits remained pink, warm, and had a capillary refill of 1 to 2 seconds after dressing application.  She tolerated the procedure very well.  She awoke from anesthesia without difficulty and was transferred to the recovery room in stable condition.     Complications:  None; patient tolerated the procedure well.    Disposition: PACU - hemodynamically stable.  Condition: stable     Tourniquet Times:     Total Tourniquet Time Documented:  Arm - Upper (Left) - 26 minutes  Total: Arm - Upper (Left) - 26 minutes      Findings: Thickened A1 pulley  Additional Details: None    Attending Attestation: I was present and scrubbed for the entire procedure.    Gunner Sánchez  Phone Number: [phone]

## 2024-03-26 LAB
LABORATORY COMMENT REPORT: NORMAL
PATH REPORT.FINAL DX SPEC: NORMAL
PATH REPORT.GROSS SPEC: NORMAL
PATH REPORT.RELEVANT HX SPEC: NORMAL
PATH REPORT.TOTAL CANCER: NORMAL

## 2024-04-29 ENCOUNTER — LAB (OUTPATIENT)
Dept: LAB | Facility: LAB | Age: 64
End: 2024-04-29
Payer: MEDICARE

## 2024-04-29 DIAGNOSIS — Z00.00 PERIODIC HEALTH ASSESSMENT, GENERAL SCREENING, ADULT: ICD-10-CM

## 2024-04-29 LAB
25(OH)D3 SERPL-MCNC: 49 NG/ML (ref 30–100)
ALBUMIN SERPL BCP-MCNC: 4.6 G/DL (ref 3.4–5)
ALP SERPL-CCNC: 63 U/L (ref 33–136)
ALT SERPL W P-5'-P-CCNC: 13 U/L (ref 7–45)
ANION GAP SERPL CALC-SCNC: 10 MMOL/L (ref 10–20)
AST SERPL W P-5'-P-CCNC: 23 U/L (ref 9–39)
BILIRUB SERPL-MCNC: 1.4 MG/DL (ref 0–1.2)
BUN SERPL-MCNC: 10 MG/DL (ref 6–23)
CALCIUM SERPL-MCNC: 9.6 MG/DL (ref 8.6–10.3)
CHLORIDE SERPL-SCNC: 102 MMOL/L (ref 98–107)
CHOLEST SERPL-MCNC: 204 MG/DL (ref 0–199)
CHOLESTEROL/HDL RATIO: 2.5
CO2 SERPL-SCNC: 30 MMOL/L (ref 21–32)
CREAT SERPL-MCNC: 0.8 MG/DL (ref 0.5–1.05)
EGFRCR SERPLBLD CKD-EPI 2021: 82 ML/MIN/1.73M*2
ERYTHROCYTE [DISTWIDTH] IN BLOOD BY AUTOMATED COUNT: 13.3 % (ref 11.5–14.5)
GLUCOSE SERPL-MCNC: 83 MG/DL (ref 74–99)
HCT VFR BLD AUTO: 41.9 % (ref 36–46)
HDLC SERPL-MCNC: 81 MG/DL
HGB BLD-MCNC: 13.4 G/DL (ref 12–16)
LDLC SERPL CALC-MCNC: 103 MG/DL
MCH RBC QN AUTO: 31.2 PG (ref 26–34)
MCHC RBC AUTO-ENTMCNC: 32 G/DL (ref 32–36)
MCV RBC AUTO: 97 FL (ref 80–100)
NON HDL CHOLESTEROL: 123 MG/DL (ref 0–149)
NRBC BLD-RTO: 0 /100 WBCS (ref 0–0)
PLATELET # BLD AUTO: 241 X10*3/UL (ref 150–450)
POTASSIUM SERPL-SCNC: 4.6 MMOL/L (ref 3.5–5.3)
PROT SERPL-MCNC: 6.9 G/DL (ref 6.4–8.2)
RBC # BLD AUTO: 4.3 X10*6/UL (ref 4–5.2)
SODIUM SERPL-SCNC: 137 MMOL/L (ref 136–145)
TRIGL SERPL-MCNC: 100 MG/DL (ref 0–149)
TSH SERPL-ACNC: 1.92 MIU/L (ref 0.44–3.98)
VLDL: 20 MG/DL (ref 0–40)
WBC # BLD AUTO: 3.3 X10*3/UL (ref 4.4–11.3)

## 2024-04-29 PROCEDURE — 80061 LIPID PANEL: CPT

## 2024-04-29 PROCEDURE — 80053 COMPREHEN METABOLIC PANEL: CPT

## 2024-04-29 PROCEDURE — 85027 COMPLETE CBC AUTOMATED: CPT

## 2024-04-29 PROCEDURE — 36415 COLL VENOUS BLD VENIPUNCTURE: CPT

## 2024-04-29 PROCEDURE — 82306 VITAMIN D 25 HYDROXY: CPT

## 2024-04-29 PROCEDURE — 84443 ASSAY THYROID STIM HORMONE: CPT

## 2024-05-08 ENCOUNTER — OFFICE VISIT (OUTPATIENT)
Dept: PRIMARY CARE | Facility: CLINIC | Age: 64
End: 2024-05-08
Payer: MEDICARE

## 2024-05-08 VITALS
TEMPERATURE: 97.3 F | WEIGHT: 137 LBS | DIASTOLIC BLOOD PRESSURE: 70 MMHG | HEART RATE: 70 BPM | HEIGHT: 66 IN | RESPIRATION RATE: 14 BRPM | OXYGEN SATURATION: 98 % | BODY MASS INDEX: 22.02 KG/M2 | SYSTOLIC BLOOD PRESSURE: 130 MMHG

## 2024-05-08 DIAGNOSIS — F51.01 PRIMARY INSOMNIA: ICD-10-CM

## 2024-05-08 DIAGNOSIS — J01.01 ACUTE RECURRENT MAXILLARY SINUSITIS: ICD-10-CM

## 2024-05-08 DIAGNOSIS — Z00.00 PERIODIC HEALTH ASSESSMENT, GENERAL SCREENING, ADULT: ICD-10-CM

## 2024-05-08 DIAGNOSIS — Z00.00 WELLNESS EXAMINATION: Primary | ICD-10-CM

## 2024-05-08 DIAGNOSIS — D72.819 LEUKOPENIA, UNSPECIFIED TYPE: ICD-10-CM

## 2024-05-08 DIAGNOSIS — E80.6 HYPERBILIRUBINEMIA: ICD-10-CM

## 2024-05-08 DIAGNOSIS — E78.00 PURE HYPERCHOLESTEROLEMIA: ICD-10-CM

## 2024-05-08 PROBLEM — E03.9 ACQUIRED HYPOTHYROIDISM: Status: RESOLVED | Noted: 2023-05-09 | Resolved: 2024-05-08

## 2024-05-08 PROCEDURE — G0402 INITIAL PREVENTIVE EXAM: HCPCS | Performed by: INTERNAL MEDICINE

## 2024-05-08 PROCEDURE — 1036F TOBACCO NON-USER: CPT | Performed by: INTERNAL MEDICINE

## 2024-05-08 PROCEDURE — 1158F ADVNC CARE PLAN TLK DOCD: CPT | Performed by: INTERNAL MEDICINE

## 2024-05-08 PROCEDURE — 1123F ACP DISCUSS/DSCN MKR DOCD: CPT | Performed by: INTERNAL MEDICINE

## 2024-05-08 PROCEDURE — 1170F FXNL STATUS ASSESSED: CPT | Performed by: INTERNAL MEDICINE

## 2024-05-08 PROCEDURE — 1159F MED LIST DOCD IN RCRD: CPT | Performed by: INTERNAL MEDICINE

## 2024-05-08 PROCEDURE — G0403 EKG FOR INITIAL PREVENT EXAM: HCPCS | Performed by: INTERNAL MEDICINE

## 2024-05-08 PROCEDURE — 1160F RVW MEDS BY RX/DR IN RCRD: CPT | Performed by: INTERNAL MEDICINE

## 2024-05-08 RX ORDER — AZITHROMYCIN 250 MG/1
TABLET, FILM COATED ORAL DAILY
Qty: 6 TABLET | Refills: 0 | Status: SHIPPED | OUTPATIENT
Start: 2024-05-08 | End: 2024-05-13

## 2024-05-08 ASSESSMENT — ENCOUNTER SYMPTOMS
WHEEZING: 0
DIARRHEA: 0
ABDOMINAL PAIN: 0
SINUS PRESSURE: 1
CONSTIPATION: 0
COUGH: 0
SHORTNESS OF BREATH: 0
HEADACHES: 1
NAUSEA: 0
PALPITATIONS: 0

## 2024-05-08 ASSESSMENT — ACTIVITIES OF DAILY LIVING (ADL)
DOING_HOUSEWORK: INDEPENDENT
BATHING: INDEPENDENT
DRESSING: INDEPENDENT
TAKING_MEDICATION: INDEPENDENT
MANAGING_FINANCES: INDEPENDENT
GROCERY_SHOPPING: INDEPENDENT

## 2024-05-08 ASSESSMENT — VISUAL ACUITY
OS_CC: 20/30
OD_CC: 20/30

## 2024-05-08 ASSESSMENT — PATIENT HEALTH QUESTIONNAIRE - PHQ9
1. LITTLE INTEREST OR PLEASURE IN DOING THINGS: NOT AT ALL
SUM OF ALL RESPONSES TO PHQ9 QUESTIONS 1 AND 2: 0
2. FEELING DOWN, DEPRESSED OR HOPELESS: NOT AT ALL

## 2024-05-08 NOTE — PROGRESS NOTES
"Subjective   Patient ID: Savanna Shah is a 64 y.o. female who presents for Welcome [employer].    Overall feeling well.  Exercises everyday.  No issues with CP, SOB or dizzy spells.  Denies any issues with stress or anxiety.  She continues to have trouble sleeping when traveling, which is fairly frequent.    She also currently has sinus pain, teeth pain and drainage.  All for about 2 weeks and progressive.      Review of Systems   HENT:  Positive for sinus pressure.    Respiratory:  Negative for cough, shortness of breath and wheezing.    Cardiovascular:  Negative for chest pain and palpitations.   Gastrointestinal:  Negative for abdominal pain, constipation, diarrhea and nausea.   Neurological:  Positive for headaches.       Objective   /70 (BP Location: Left arm, Patient Position: Sitting, BP Cuff Size: Adult)   Pulse 70   Temp 36.3 °C (97.3 °F) (Tympanic)   Resp 14   Ht 1.676 m (5' 6\")   Wt 62.1 kg (137 lb)   SpO2 98%   BMI 22.11 kg/m²     Physical Exam  Vitals reviewed.   Constitutional:       Appearance: Normal appearance.   HENT:      Head: Normocephalic.   Cardiovascular:      Rate and Rhythm: Normal rate and regular rhythm.   Pulmonary:      Effort: Pulmonary effort is normal.      Breath sounds: Normal breath sounds.   Musculoskeletal:         General: Normal range of motion.   Neurological:      General: No focal deficit present.      Mental Status: She is alert.   Psychiatric:         Mood and Affect: Mood normal.         Assessment/Plan   Problem List Items Addressed This Visit             ICD-10-CM    Pure hypercholesterolemia E78.00    Insomnia G47.00     Other Visit Diagnoses         Codes    Wellness examination    -  Primary Z00.00    Relevant Orders    ECG 12 Lead (Completed)    Periodic health assessment, general screening, adult     Z00.00    Leukopenia, unspecified type     D72.819    Relevant Orders    CBC and Auto Differential    Hyperbilirubinemia     E80.6    Relevant Orders "    Comprehensive Metabolic Panel    Acute recurrent maxillary sinusitis     J01.01    Relevant Medications    azithromycin (Zithromax) 250 mg tablet        We discussed all of the above.    Labs were reviewed and discussed.  EKG is unremarkable.    We will treat her sinus infection with antibiotic, intra nasal steroid.    We discussed her low WBC and elevated bilirubin.    We discussed her sleep and the ambien.    Annual Wellness Visit questions and answers were reviewed and discussed including the importance of discussing end of life wishes as well as having a living will and health care power of .

## 2024-08-26 ENCOUNTER — OFFICE VISIT (OUTPATIENT)
Dept: PRIMARY CARE | Facility: CLINIC | Age: 64
End: 2024-08-26
Payer: MEDICARE

## 2024-08-26 VITALS
RESPIRATION RATE: 14 BRPM | HEIGHT: 66 IN | HEART RATE: 68 BPM | SYSTOLIC BLOOD PRESSURE: 134 MMHG | WEIGHT: 135 LBS | BODY MASS INDEX: 21.69 KG/M2 | DIASTOLIC BLOOD PRESSURE: 70 MMHG | TEMPERATURE: 97.3 F | OXYGEN SATURATION: 98 %

## 2024-08-26 DIAGNOSIS — M70.22 OLECRANON BURSITIS OF LEFT ELBOW: Primary | ICD-10-CM

## 2024-08-26 DIAGNOSIS — S09.90XS INJURY OF HEAD, SEQUELA: ICD-10-CM

## 2024-08-26 DIAGNOSIS — S40.022S CONTUSION OF LEFT UPPER EXTREMITY, SEQUELA: ICD-10-CM

## 2024-08-26 PROCEDURE — 3008F BODY MASS INDEX DOCD: CPT | Performed by: INTERNAL MEDICINE

## 2024-08-26 PROCEDURE — 1159F MED LIST DOCD IN RCRD: CPT | Performed by: INTERNAL MEDICINE

## 2024-08-26 PROCEDURE — 1123F ACP DISCUSS/DSCN MKR DOCD: CPT | Performed by: INTERNAL MEDICINE

## 2024-08-26 PROCEDURE — 1158F ADVNC CARE PLAN TLK DOCD: CPT | Performed by: INTERNAL MEDICINE

## 2024-08-26 PROCEDURE — 99213 OFFICE O/P EST LOW 20 MIN: CPT | Performed by: INTERNAL MEDICINE

## 2024-08-26 PROCEDURE — 1036F TOBACCO NON-USER: CPT | Performed by: INTERNAL MEDICINE

## 2024-08-26 ASSESSMENT — ENCOUNTER SYMPTOMS
RECTAL PAIN: 0
SHORTNESS OF BREATH: 0
CONSTIPATION: 0
PALPITATIONS: 0
WHEEZING: 0
COUGH: 0
DIARRHEA: 0
ABDOMINAL PAIN: 0

## 2024-08-26 NOTE — PROGRESS NOTES
"Subjective   Patient ID: Savanna Shah is a 64 y.o. female who presents for Fall.    Fall  Pertinent negatives include no abdominal pain.   Slipped on water at home just over 1 week ago.    Hit head and left elbow.   Her head feels fine.  No HA's.  No N/V.  No vision changes.    Her left elbow is swollen and has bruising down her left arm    She was seen at [facility] urgent care.  She states an xray was done there and there was no fracture.      Review of Systems   Respiratory:  Negative for cough, shortness of breath and wheezing.    Cardiovascular:  Negative for chest pain and palpitations.   Gastrointestinal:  Negative for abdominal pain, constipation, diarrhea and rectal pain.     Objective   /70 (BP Location: Left arm, Patient Position: Sitting, BP Cuff Size: Adult)   Pulse 68   Temp 36.3 °C (97.3 °F) (Tympanic)   Resp 14   Ht 1.676 m (5' 6\")   Wt 61.2 kg (135 lb)   SpO2 98%   BMI 21.79 kg/m²     Physical Exam  Constitutional:       Appearance: Normal appearance.   HENT:      Head: Normocephalic.   Eyes:      Extraocular Movements: Extraocular movements intact.      Pupils: Pupils are equal, round, and reactive to light.   Musculoskeletal:         General: Swelling present. Normal range of motion.      Cervical back: Normal range of motion.      Comments: Full ROM through elbow.  Small amount of swelling of the olecranon bursa.     Skin:     Comments: Bruising through lower arm   Neurological:      Mental Status: She is alert.         Assessment/Plan   Problem List Items Addressed This Visit    None  Visit Diagnoses         Codes    Olecranon bursitis of left elbow    -  Primary M70.22    Contusion of left upper extremity, sequela     S40.022S    Injury of head, sequela     S09.90XS        Fall at home.    She actually feels ok.  She is more concerned about the potential for a blood clot due to the bruising.  She is leaving for [facility] in 1 week.    We discussed trying to stay active to keep blood " flowing.  No concern for blood clot presently.  She is concerned about blood clot during flight.  Discussed taking an aspirin the day before and the day of the flight on the way there and the way back.    Due to the bruising I would not recommend starting any earlier.    No signs of any other injury - head or otherwise.    Follow up prn.

## 2024-11-08 ENCOUNTER — APPOINTMENT (OUTPATIENT)
Dept: PRIMARY CARE | Facility: CLINIC | Age: 64
End: 2024-11-08
Payer: MEDICARE

## 2025-03-17 ENCOUNTER — APPOINTMENT (OUTPATIENT)
Dept: CARDIOLOGY | Facility: CLINIC | Age: 65
End: 2025-03-17
Payer: MEDICARE

## 2025-03-17 VITALS
WEIGHT: 138.5 LBS | BODY MASS INDEX: 22.26 KG/M2 | SYSTOLIC BLOOD PRESSURE: 124 MMHG | HEART RATE: 59 BPM | HEIGHT: 66 IN | DIASTOLIC BLOOD PRESSURE: 84 MMHG

## 2025-03-17 DIAGNOSIS — I25.10 CORONARY ARTERY DISEASE INVOLVING NATIVE HEART, UNSPECIFIED VESSEL OR LESION TYPE, UNSPECIFIED WHETHER ANGINA PRESENT: ICD-10-CM

## 2025-03-17 DIAGNOSIS — R07.89 OTHER CHEST PAIN: Primary | ICD-10-CM

## 2025-03-17 DIAGNOSIS — R00.2 PALPITATIONS: ICD-10-CM

## 2025-03-17 DIAGNOSIS — Z76.89 ENCOUNTER TO ESTABLISH CARE: ICD-10-CM

## 2025-03-17 DIAGNOSIS — Z78.9 NEVER SMOKED CIGARETTES: ICD-10-CM

## 2025-03-17 PROCEDURE — 1123F ACP DISCUSS/DSCN MKR DOCD: CPT | Performed by: INTERNAL MEDICINE

## 2025-03-17 PROCEDURE — 99205 OFFICE O/P NEW HI 60 MIN: CPT | Performed by: INTERNAL MEDICINE

## 2025-03-17 PROCEDURE — 93000 ELECTROCARDIOGRAM COMPLETE: CPT | Performed by: INTERNAL MEDICINE

## 2025-03-17 PROCEDURE — 3008F BODY MASS INDEX DOCD: CPT | Performed by: INTERNAL MEDICINE

## 2025-03-17 PROCEDURE — 1036F TOBACCO NON-USER: CPT | Performed by: INTERNAL MEDICINE

## 2025-03-17 PROCEDURE — 1159F MED LIST DOCD IN RCRD: CPT | Performed by: INTERNAL MEDICINE

## 2025-03-17 RX ORDER — ATENOLOL 50 MG/1
TABLET ORAL
Qty: 1 TABLET | Refills: 0 | Status: SHIPPED | OUTPATIENT
Start: 2025-03-17

## 2025-03-17 NOTE — PATIENT INSTRUCTIONS
Continue same medications and treatments.   Patient educated on proper medication use.   Patient educated on risk factor modification.   Please bring any lab results from other providers / physicians to your next appointment.     Please bring all medicines, vitamins, and herbal supplements with you when you come to the office.     Prescriptions will not be filled unless you are compliant with your follow up appointments or have a follow up appointment scheduled as per instruction of your physician. Refills should be requested at the time of your visit.    Check your blood pressure 3 times a week for 2 weeks prior to your next office visit. Bring your blood pressure readings and your blood pressure machine to your next office visit.     CONSIDER THE [employer] HEART RHYTHM MONITOR    CARDIAC CTA ORDERED, TAKE ATENOLOL 50 MG 2 HOURS PRIOR, MAY NEED LAB WORK PRIOR TO THIS TEST    ECHO ORDERED    28 DAY ZIO PATCH ORDERED    FOLLOW UP AFTER TESTING (JUNE)    IMeagan LPN, am scribing for and in the presence of Dr. Sachin Phillip, DO, FACC

## 2025-03-17 NOTE — PROGRESS NOTES
CARDIOLOGY OFFICE VISIT      CHIEF COMPLAINT  Chief Complaint   Patient presents with    New Patient Visit     Here to reestablish care. LOV         HISTORY OF PRESENT ILLNESS    GEMA SHELTON is a 65-year-old  female with a past medical history significant for minimal carotid artery disease, family history of premature coronary artery disease, who presents for follow-up cardiovascular care.    Patient has been having symptoms of chest tightness unrelated to activity every 2 weeks.  She complains of palpitations at times waking her from sleep.  She was exercising elliptical 1 day and had a sudden change in her heart rate up to 160-170 bpm.  Later that day she had a resting heart rate in the 130s which is unusual for her.  Denies dyspnea, nausea, vomiting, dizziness, near-syncope, ry syncope.  Patient exercises daily and elliptical 45 minutes, then treadmill 20 minutes.    REVIEW OF SYSTEMS: Negative, noncontributory or as previously mentioned x 12 systems.     PAST MEDICAL HISTORY: As above plus urinary tract infections, insomnia, acne, and ovarian cyst.     PAST SURGICAL HISTORY:   LEEP.   Bilateral thumb surgery    SOCIAL HISTORY: Denies tobacco use. Occasional alcohol use.     FAMILY HISTORY: Dad  at 48 with myocardial infarction, first diagnosed with coronary disease in his thirties. Brother had myocardial infarction, coronary artery bypass graft, and pacemaker at age 48. Mom alive at 80 with a history of cerebrovascular accident in her thirties.   Sister  age 63 with history of coronary disease/PCI, complication from cardiac cath    I personally reviewed EKG 2025: Sinus bradycardia 59 bpm    ASSESSMENT   Coronary artery disease, CAC 65  Chest tightness.   Palpitations.   Tachycardia  Family history of premature coronary artery disease.   Minimal carotid artery disease.       RECOMMENDATIONS  To further evaluate presenting symptoms obtain cardiac CT of the coronary  arteries, echocardiogram, 28-day event monitor.  Advise she get her own home Kardia monitor.  Will give atenolol to 50 mg 2 hours prior to cardiac CT.  Maintain blood pressure diary.  Follow-up after testing.    Please excuse any errors in grammar or translation related to this dictation.  Voice recognition software was utilized to prepare this document.    Recent Cardiovascular Testing:  The following results have been reviewed and updated.     GXT: 07   1. Normal.   2. 13.6 METS    CRD: 3/23/22  1. Minimal carotid disease.     Labs: 24  1. TC: 204, Tri HDL: 81, LDL: 103    CT Calcium Score 3/22/22  Score = 65    VITALS  Vitals:    25 1408   BP: 124/84   Pulse:      Wt Readings from Last 4 Encounters:   25 62.8 kg (138 lb 8 oz)   24 61.2 kg (135 lb)   24 62.1 kg (137 lb)   24 60.3 kg (133 lb)       PHYSICAL EXAM:  GENERAL:  Well developed, well nourished, in no acute distress.  CHEST:  Symmetric and nontender.  NEURO/PSYCH:  Alert and oriented times three with approppriate behavior and responses.  NECK:  Supple, no JVD, no bruit.  LUNGS:  Clear to auscultation bilaterally, normal respiratory effort.  HEART:  Rate and rhythm regular with no evident murmur, no gallop appreciated.                   There are no rubs, clicks or heaves.  EXTREMITIES:  Warm with good color, no clubbing or cyanosis.  There is no edema noted.  PERIPHERAL VASCULAR:  Pulses present and equally palpable; 2+ throughout.    ASSESSMENT AND PLAN  Diagnoses and all orders for this visit:  Other chest pain  -     ECG 12 lead (Clinic Performed)  -     CT angio coronary arteries w C EVAL of cardiac structure morphology; Future  -     Transthoracic Echo (TTE) Complete; Future  -     BUN; Future  -     Creatinine; Future  -     atenolol (Tenormin) 50 mg tablet; Take 1 tablet 2 hours prior to the cardiac CTA scan  Encounter to establish care  -     ECG 12 lead (Clinic Performed)  Palpitations  -     CT  "angio coronary arteries w C EVAL of cardiac structure morphology; Future  -     Holter Or Event Cardiac Monitor; Future  -     Holter Or Event Cardiac Monitor; Future  Coronary artery disease involving native heart, unspecified vessel or lesion type, unspecified whether angina present  -     Transthoracic Echo (TTE) Complete; Future  Never smoked cigarettes  BMI 22.0-22.9, adult      Past Medical History  Past Medical History:   Diagnosis Date    Insect bite (nonvenomous), unspecified lower leg, initial encounter (CODE) 10/09/2019    Insect bite of lower leg, unspecified laterality, initial encounter    Melanoma (Multi)     Trigger finger        Social History  Social History     Tobacco Use    Smoking status: Never    Smokeless tobacco: Never   Vaping Use    Vaping status: Never Used   Substance Use Topics    Alcohol use: Not Currently    Drug use: Never       Family History     Family History   Problem Relation Name Age of Onset    Heart attack Father      Heart attack Sister      Diabetes Sister      Other (CABG) Brother          Allergies:  Allergies   Allergen Reactions    Penicillins Unknown        Outpatient Medications:  Current Outpatient Medications   Medication Instructions    cholecalciferol (VITAMIN D3) 1,000 Units, Daily    fluticasone (Flonase) 50 mcg/actuation nasal spray Administer into affected nostril(s).    mecobalamin (B12 ACTIVE ORAL) 1 capsule, Daily    multivitamin tablet 1 tablet, Daily    zolpidem (AMBIEN) 5 mg, oral, Nightly PRN        Recent Lab Results:    CMP:    Lab Results   Component Value Date     04/29/2024    K 4.6 04/29/2024     04/29/2024    CO2 30 04/29/2024    BUN 10 04/29/2024    CREATININE 0.80 04/29/2024    GLUCOSE 83 04/29/2024    CALCIUM 9.6 04/29/2024       Magnesium:    No results found for: \"MG\"    Lipid Profile:    Lab Results   Component Value Date    TRIG 100 04/29/2024    HDL 81.0 04/29/2024    LDLCALC 103 (H) 04/29/2024       TSH:    Lab Results " "  Component Value Date    TSH 1.92 04/29/2024       BNP:   No results found for: \"BNP\"     PT/INR:    Lab Results   Component Value Date    PROTIME 11.0 10/24/2022    INR 1.0 10/24/2022       HgBA1c:    No results found for: \"HGBA1C\"    BMP:  Lab Results   Component Value Date     04/29/2024     10/24/2022     06/07/2022     02/04/2021    K 4.6 04/29/2024    K 4.3 10/24/2022    K 4.1 06/07/2022    K 4.5 02/04/2021     04/29/2024     10/24/2022     06/07/2022     02/04/2021    CO2 30 04/29/2024    CO2 27 10/24/2022    CO2 27 06/07/2022    CO2 29 02/04/2021    BUN 10 04/29/2024    BUN 12 10/24/2022    BUN 12 06/07/2022    BUN 17 02/04/2021    CREATININE 0.80 04/29/2024    CREATININE 0.69 10/24/2022    CREATININE 0.80 06/07/2022    CREATININE 0.74 02/04/2021       CBC:  Lab Results   Component Value Date    WBC 3.3 (L) 04/29/2024    WBC 5.6 10/24/2022    WBC 3.8 (L) 06/07/2022    WBC 4.3 (L) 08/23/2021    RBC 4.30 04/29/2024    RBC 4.09 10/24/2022    RBC 4.39 06/07/2022    RBC 4.09 08/23/2021    HGB 13.4 04/29/2024    HGB 13.0 10/24/2022    HGB 13.9 06/07/2022    HGB 13.1 08/23/2021    HCT 41.9 04/29/2024    HCT 40.1 10/24/2022    HCT 42.8 06/07/2022    HCT 41.0 08/23/2021    MCV 97 04/29/2024    MCV 98 10/24/2022    MCV 97 06/07/2022     08/23/2021    MCH 31.2 04/29/2024    MCHC 32.0 04/29/2024    MCHC 32.4 10/24/2022    MCHC 32.5 06/07/2022    MCHC 32.0 08/23/2021    RDW 13.3 04/29/2024    RDW 13.1 10/24/2022    RDW 12.9 06/07/2022    RDW 13.4 08/23/2021     04/29/2024     10/24/2022     06/07/2022     08/23/2021       Cardiac Enzymes:    No results found for: \"TROPHS\"    Hepatic Function Panel:    Lab Results   Component Value Date    ALKPHOS 63 04/29/2024    ALT 13 04/29/2024    AST 23 04/29/2024    PROT 6.9 04/29/2024    BILITOT 1.4 (H) 04/29/2024           Meagan GRIFFITHS LPN am scribing for, and in the presence of Dr. Sachin IGLESIAS " DO Marjan, JESSICA.    I, Dr. Sachin Phillip DO, JESSICA, personally performed the services described in the documentation as scribed by Meagan Flores LPN in my presence, and confirm it is both accurate and complete.      Dr. Sachin Phillip DO  Thank you for allowing me to participate in the care of this patient. Please do not hesitate to contact me with any further questions or concerns.

## 2025-03-25 ENCOUNTER — HOSPITAL ENCOUNTER (OUTPATIENT)
Dept: CARDIOLOGY | Facility: HOSPITAL | Age: 65
Discharge: HOME | End: 2025-03-25
Payer: MEDICARE

## 2025-03-25 DIAGNOSIS — R07.9 CHEST PAIN, UNSPECIFIED: ICD-10-CM

## 2025-03-25 DIAGNOSIS — I25.10 CORONARY ARTERY DISEASE INVOLVING NATIVE HEART, UNSPECIFIED VESSEL OR LESION TYPE, UNSPECIFIED WHETHER ANGINA PRESENT: ICD-10-CM

## 2025-03-25 DIAGNOSIS — R07.89 OTHER CHEST PAIN: ICD-10-CM

## 2025-03-25 LAB
AORTIC VALVE MEAN GRADIENT: 2 MMHG
AORTIC VALVE PEAK VELOCITY: 1.01 M/S
AV PEAK GRADIENT: 4 MMHG
BUN SERPL-MCNC: 18 MG/DL (ref 7–25)
CREAT SERPL-MCNC: 0.72 MG/DL (ref 0.5–1.05)
EGFRCR SERPLBLD CKD-EPI 2021: 92 ML/MIN/1.73M2
EJECTION FRACTION APICAL 4 CHAMBER: 65.2
EJECTION FRACTION: 63 %
LEFT ATRIUM VOLUME AREA LENGTH INDEX BSA: 27.8 ML/M2
LEFT VENTRICLE INTERNAL DIMENSION DIASTOLE: 3.89 CM (ref 3.5–6)
LV EJECTION FRACTION BIPLANE: 64 %
MITRAL VALVE E/A RATIO: 0.8
MITRAL VALVE E/E' RATIO: 5.4
RIGHT VENTRICLE FREE WALL PEAK S': 13.8 CM/S
RIGHT VENTRICLE PEAK SYSTOLIC PRESSURE: 21.3 MMHG
TRICUSPID ANNULAR PLANE SYSTOLIC EXCURSION: 2.4 CM

## 2025-03-25 PROCEDURE — 93306 TTE W/DOPPLER COMPLETE: CPT

## 2025-03-25 PROCEDURE — 93306 TTE W/DOPPLER COMPLETE: CPT | Performed by: INTERNAL MEDICINE

## 2025-03-25 RX ORDER — METOPROLOL TARTRATE 1 MG/ML
10 INJECTION, SOLUTION INTRAVENOUS EVERY 5 MIN PRN
Status: CANCELLED | OUTPATIENT
Start: 2025-03-28

## 2025-03-25 RX ORDER — NITROGLYCERIN 400 UG/1
2 SPRAY ORAL ONCE
Status: CANCELLED | OUTPATIENT
Start: 2025-03-28

## 2025-03-25 RX ORDER — METOPROLOL TARTRATE 25 MG/1
100 TABLET, FILM COATED ORAL
Status: CANCELLED | OUTPATIENT
Start: 2025-03-28

## 2025-03-28 ENCOUNTER — HOSPITAL ENCOUNTER (OUTPATIENT)
Dept: RADIOLOGY | Facility: CLINIC | Age: 65
Discharge: HOME | End: 2025-03-28
Payer: MEDICARE

## 2025-03-28 ENCOUNTER — APPOINTMENT (OUTPATIENT)
Dept: CARDIOLOGY | Facility: CLINIC | Age: 65
End: 2025-03-28
Payer: MEDICARE

## 2025-03-28 VITALS
HEART RATE: 58 BPM | DIASTOLIC BLOOD PRESSURE: 78 MMHG | RESPIRATION RATE: 20 BRPM | SYSTOLIC BLOOD PRESSURE: 133 MMHG | OXYGEN SATURATION: 100 %

## 2025-03-28 DIAGNOSIS — R07.89 OTHER CHEST PAIN: Primary | ICD-10-CM

## 2025-03-28 DIAGNOSIS — R00.2 PALPITATIONS: ICD-10-CM

## 2025-03-28 PROCEDURE — 2500000001 HC RX 250 WO HCPCS SELF ADMINISTERED DRUGS (ALT 637 FOR MEDICARE OP): Performed by: INTERNAL MEDICINE

## 2025-03-28 PROCEDURE — 2550000001 HC RX 255 CONTRASTS: Performed by: INTERNAL MEDICINE

## 2025-03-28 PROCEDURE — 75574 CT ANGIO HRT W/3D IMAGE: CPT

## 2025-03-28 RX ORDER — NITROGLYCERIN 400 UG/1
2 SPRAY ORAL ONCE
Status: COMPLETED | OUTPATIENT
Start: 2025-03-28 | End: 2025-03-28

## 2025-03-28 RX ADMIN — NITROGLYCERIN 2 SPRAY: 400 SPRAY ORAL at 08:53

## 2025-03-28 RX ADMIN — IOHEXOL 85 ML: 350 INJECTION, SOLUTION INTRAVENOUS at 09:10

## 2025-04-11 ENCOUNTER — APPOINTMENT (OUTPATIENT)
Dept: CARDIOLOGY | Facility: CLINIC | Age: 65
End: 2025-04-11
Payer: MEDICARE

## 2025-04-11 DIAGNOSIS — R00.2 PALPITATIONS: ICD-10-CM

## 2025-04-24 PROCEDURE — 93248 EXT ECG>7D<15D REV&INTERPJ: CPT | Performed by: INTERNAL MEDICINE

## 2025-04-30 PROCEDURE — 93248 EXT ECG>7D<15D REV&INTERPJ: CPT | Performed by: INTERNAL MEDICINE

## 2025-05-19 ENCOUNTER — APPOINTMENT (OUTPATIENT)
Dept: CARDIOLOGY | Facility: CLINIC | Age: 65
End: 2025-05-19
Payer: MEDICARE

## 2025-05-19 VITALS
SYSTOLIC BLOOD PRESSURE: 124 MMHG | DIASTOLIC BLOOD PRESSURE: 70 MMHG | HEART RATE: 72 BPM | BODY MASS INDEX: 22.13 KG/M2 | WEIGHT: 136.1 LBS

## 2025-05-19 DIAGNOSIS — R00.2 PALPITATIONS: ICD-10-CM

## 2025-05-19 DIAGNOSIS — Z82.49 FAMILY HISTORY OF CORONARY ARTERY DISEASE: ICD-10-CM

## 2025-05-19 DIAGNOSIS — I25.10 CORONARY ARTERY DISEASE INVOLVING NATIVE HEART, UNSPECIFIED VESSEL OR LESION TYPE, UNSPECIFIED WHETHER ANGINA PRESENT: ICD-10-CM

## 2025-05-19 DIAGNOSIS — I35.1 NONRHEUMATIC AORTIC VALVE INSUFFICIENCY: ICD-10-CM

## 2025-05-19 DIAGNOSIS — I07.1 MILD TRICUSPID REGURGITATION: ICD-10-CM

## 2025-05-19 DIAGNOSIS — Z78.9 NEVER SMOKED CIGARETTES: ICD-10-CM

## 2025-05-19 DIAGNOSIS — I47.10 PSVT (PAROXYSMAL SUPRAVENTRICULAR TACHYCARDIA): ICD-10-CM

## 2025-05-19 PROBLEM — R00.0 TACHYCARDIA, UNSPECIFIED: Status: ACTIVE | Noted: 2025-05-19

## 2025-05-19 PROCEDURE — 99214 OFFICE O/P EST MOD 30 MIN: CPT | Performed by: INTERNAL MEDICINE

## 2025-05-19 PROCEDURE — 1159F MED LIST DOCD IN RCRD: CPT | Performed by: INTERNAL MEDICINE

## 2025-05-19 PROCEDURE — 1036F TOBACCO NON-USER: CPT | Performed by: INTERNAL MEDICINE

## 2025-05-19 RX ORDER — LANOLIN ALCOHOL/MO/W.PET/CERES
400 CREAM (GRAM) TOPICAL DAILY
Qty: 90 TABLET | Refills: 3 | Status: SHIPPED | OUTPATIENT
Start: 2025-05-19 | End: 2026-05-19

## 2025-05-19 RX ORDER — METOPROLOL SUCCINATE 25 MG/1
TABLET, EXTENDED RELEASE ORAL
Qty: 30 TABLET | Refills: 11 | Status: SHIPPED | OUTPATIENT
Start: 2025-05-19

## 2025-05-19 NOTE — PATIENT INSTRUCTIONS
Patient to follow up in 6 months with Dr. Marjan DO     Monitor demonstrated SVT- supra-ventricular tachycardia   Will offer Metoprolol Succinate 25mg  be used only if you feel palpitations, heart racing symptoms once daily as needed.     Will also suggest you START Magnesium Oxide 400mg once daily with a small meal.     Please complete lab work prior to next visit- orders in system.     No other changes today.   Continue same medications and treatments.   Patient educated on proper medication use.   Patient educated on risk factor modification.   Please bring any lab results from other providers / physicians to your next appointment.     Please bring all medicines, vitamins, and herbal supplements with you when you come to the office.     Prescriptions will not be filled unless you are compliant with your follow up appointments or have a follow up appointment scheduled as per instruction of your physician. Refills should be requested at the time of your visit.    IBurt RN am scribing for and in the presence of Dr. Sachin Phillip DO

## 2025-05-19 NOTE — PROGRESS NOTES
CARDIOLOGY OFFICE VISIT      CHIEF COMPLAINT  Chief Complaint   Patient presents with    Follow-up     2 month follow-up for management of chest pain.  She is here to discuss testing results        HISTORY OF PRESENT ILLNESS  GEMA SHELTON is a 65-year-old  female with a past medical history significant for minimal carotid artery disease, family history of premature coronary artery disease, who presents for follow-up cardiovascular care.     Since last office visit patient has had no further chest pain.  Denies dyspnea on exertion.  She has palpitations once every 3 weeks that are brief in duration.  Denies nausea, vomiting, dizziness, near-syncope, ry syncope, edema.  Patient exercises daily elliptical 45 minutes, then treadmill 20 minutes.     REVIEW OF SYSTEMS: Negative, noncontributory or as previously mentioned x 12 systems.      PAST MEDICAL HISTORY: As above plus urinary tract infections, insomnia, acne, and ovarian cyst.      PAST SURGICAL HISTORY:   LEEP.   Bilateral thumb surgery     SOCIAL HISTORY: Denies tobacco use. Occasional alcohol use.      FAMILY HISTORY: Dad  at 48 with myocardial infarction, first diagnosed with coronary disease in his thirties. Brother had myocardial infarction, coronary artery bypass graft, and pacemaker at age 48. Mom alive at 80 with a history of cerebrovascular accident in her thirties.   Sister  age 63 with history of coronary disease/PCI, complication from cardiac cath     I personally reviewed EKG 2025: Sinus bradycardia 59 bpm     ASSESSMENT   Coronary artery disease-on CCTA 2025, CAC 76, 76 percentile  Paroxysmal supraventricular tachycardia, maximum rate 200 bpm  Aortic valve regurgitation  Family history of premature coronary artery disease.        RECOMMENDATIONS  We reviewed test findings in detail.  Home blood pressure readings revealed some mild elevated diastolic readings.  We discussed medical therapy in light of  her SVT and aortic valve regurgitation.  At this time she does not want to take a daily medication.  If she notices her cyst diastolic blood pressure continues to be over 80 mmHg she will start the medication.  Will give her a prescription for metoprolol succinate 25 mg as needed for palpitations.  Add magnesium oxide 400 mg daily.  Emphasize importance of low-sodium diet less than 2000 mg/day.  Consider future use of statin therapy.  Follow-up in 6 months.  Maintain blood pressure diary.  Check CMP, lipid profile prior to that office visit.     Please excuse any errors in grammar or translation related to this dictation.  Voice recognition software was utilized to prepare this document.    Recent Cardiovascular Testing:  The following results have been reviewed and updated.     CT Coronaries 3/28/25:  Mild CAD  Coronary calcium score 76; 76th percentile     14 day Holter 25:  NSR average rate 74bpm, range 45-173bpm  PxSVT, maximum rate 170bpm    14 day Holter 3/28/25:  PxSVT, maximum rate 200bpm    Echo 3/25/25:  EF 60-65%  Mild tricuspid regurgitation  2+ Aortic regurgitation    GXT: 07   1. Normal.   2. 13.6 METS     CRD: 3/23/22  1. Minimal carotid disease.      Labs: 24  1. TC: 204, Tri HDL: 81, LDL: 103     CT Calcium Score 3/22/22  Score = 65    VITALS  her blood pressure on her machine was 104/82 HR 92  Vitals:    25 1210   BP: 124/70   Pulse: 72     Wt Readings from Last 4 Encounters:   25 61.7 kg (136 lb 1.6 oz)   25 62.8 kg (138 lb 8 oz)   24 61.2 kg (135 lb)   24 62.1 kg (137 lb)       PHYSICAL EXAM:  GENERAL:  Well developed, well nourished, in no acute distress.  CHEST:  Symmetric and nontender.  NEURO/PSYCH:  Alert and oriented times three with approppriate behavior and responses.  NECK:  Supple, no JVD, no bruit.  LUNGS:  Clear to auscultation bilaterally, normal respiratory effort.  HEART:  Rate and rhythm regular with no evident murmur, no gallop  appreciated. There are no rubs, clicks or heaves.  EXTREMITIES:  Warm with good color, no clubbing or cyanosis.  There is no edema noted.  PERIPHERAL VASCULAR:  Pulses present and equally palpable; 2+ throughout.    ASSESSMENT AND PLAN  Diagnoses and all orders for this visit:  Coronary artery disease involving native heart, unspecified vessel or lesion type, unspecified whether angina present  -     Follow Up In Cardiology; Future  -     Comprehensive metabolic panel; Future  -     Lipid panel; Future  Palpitations  -     Follow Up In Cardiology; Future  -     metoprolol succinate XL (Toprol-XL) 25 mg 24 hr tablet; Take 25mg once daily as needed for heart racing, palpitation feelings  -     magnesium oxide (Mag-Ox) 400 mg (241.3 mg elemental) tablet; Take 1 tablet by mouth once daily.  Family history of coronary artery disease  -     Follow Up In Cardiology; Future  -     Comprehensive metabolic panel; Future  -     Lipid panel; Future  BMI 22.0-22.9, adult  -     Follow Up In Cardiology; Future  Never smoked cigarettes  -     Follow Up In Cardiology; Future  PSVT (paroxysmal supraventricular tachycardia) (CMS-[facility])  -     metoprolol succinate XL (Toprol-XL) 25 mg 24 hr tablet; Take 25mg once daily as needed for heart racing, palpitation feelings  -     magnesium oxide (Mag-Ox) 400 mg (241.3 mg elemental) tablet; Take 1 tablet by mouth once daily.  Mild tricuspid regurgitation  Nonrheumatic aortic valve insufficiency      Past Medical History  Medical History[1]    Social History  Social History[2]    Family History   Family History[3]     Allergies:  RX Allergies[4]     Outpatient Medications:  Current Outpatient Medications   Medication Instructions    cholecalciferol (VITAMIN D3) 1,000 Units, Daily    fluticasone (Flonase) 50 mcg/actuation nasal spray Administer into affected nostril(s).    mecobalamin (B12 ACTIVE ORAL) 1 capsule, Daily    multivitamin tablet 1 tablet, Daily    zolpidem (AMBIEN) 5 mg, oral,  "Nightly PRN        Recent Lab Results:    CMP:    Lab Results   Component Value Date     04/29/2024    K 4.6 04/29/2024     04/29/2024    CO2 30 04/29/2024    BUN 18 03/24/2025    CREATININE 0.72 03/24/2025    GLUCOSE 83 04/29/2024    CALCIUM 9.6 04/29/2024       Magnesium:    No results found for: \"MG\"    Lipid Profile:    Lab Results   Component Value Date    TRIG 100 04/29/2024    HDL 81.0 04/29/2024    LDLCALC 103 (H) 04/29/2024       TSH:    Lab Results   Component Value Date    TSH 1.92 04/29/2024       BNP:   No results found for: \"BNP\"     PT/INR:    Lab Results   Component Value Date    PROTIME 11.0 10/24/2022    INR 1.0 10/24/2022       HgBA1c:    No results found for: \"HGBA1C\"    BMP:  Lab Results   Component Value Date     04/29/2024     10/24/2022     06/07/2022     02/04/2021    K 4.6 04/29/2024    K 4.3 10/24/2022    K 4.1 06/07/2022    K 4.5 02/04/2021     04/29/2024     10/24/2022     06/07/2022     02/04/2021    CO2 30 04/29/2024    CO2 27 10/24/2022    CO2 27 06/07/2022    CO2 29 02/04/2021    BUN 18 03/24/2025    BUN 10 04/29/2024    BUN 12 10/24/2022    BUN 12 06/07/2022    BUN 17 02/04/2021    CREATININE 0.72 03/24/2025    CREATININE 0.80 04/29/2024    CREATININE 0.69 10/24/2022    CREATININE 0.80 06/07/2022    CREATININE 0.74 02/04/2021       CBC:  Lab Results   Component Value Date    WBC 3.3 (L) 04/29/2024    WBC 5.6 10/24/2022    WBC 3.8 (L) 06/07/2022    WBC 4.3 (L) 08/23/2021    RBC 4.30 04/29/2024    RBC 4.09 10/24/2022    RBC 4.39 06/07/2022    RBC 4.09 08/23/2021    HGB 13.4 04/29/2024    HGB 13.0 10/24/2022    HGB 13.9 06/07/2022    HGB 13.1 08/23/2021    HCT 41.9 04/29/2024    HCT 40.1 10/24/2022    HCT 42.8 06/07/2022    HCT 41.0 08/23/2021    MCV 97 04/29/2024    MCV 98 10/24/2022    MCV 97 06/07/2022     08/23/2021    MCH 31.2 04/29/2024    MCHC 32.0 04/29/2024    MCHC 32.4 10/24/2022    MCHC 32.5 06/07/2022    " "MCHC 32.0 08/23/2021    RDW 13.3 04/29/2024    RDW 13.1 10/24/2022    RDW 12.9 06/07/2022    RDW 13.4 08/23/2021     04/29/2024     10/24/2022     06/07/2022     08/23/2021       Cardiac Enzymes:    No results found for: \"TROPHS\"    Hepatic Function Panel:    Lab Results   Component Value Date    ALKPHOS 63 04/29/2024    ALT 13 04/29/2024    AST 23 04/29/2024    PROT 6.9 04/29/2024    BILITOT 1.4 (H) 04/29/2024           IBurt RN   am scribing for, and in the presence of Dr. Marjan DO.    IDr. Marjan DO, personally performed the services described in the documentation as scribed by Burt Parrish RN   in my presence, and confirm it is both accurate and complete.      Dr. Sachin Phillip DO  Thank you for allowing me to participate in the care of this patient. Please do not hesitate to contact me with any further questions or concerns.               [1]   Past Medical History:  Diagnosis Date    Heart murmur Child    Insect bite (nonvenomous), unspecified lower leg, initial encounter (CODE) 10/09/2019    Insect bite of lower leg, unspecified laterality, initial encounter    Melanoma (Multi)     Trigger finger    [2]   Social History  Tobacco Use    Smoking status: Never    Smokeless tobacco: Never   Vaping Use    Vaping status: Never Used   Substance Use Topics    Alcohol use: Not Currently    Drug use: Never   [3]   Family History  Problem Relation Name Age of Onset    Stroke Mother Mom     Heart attack Father Father     Heart attack Sister      Diabetes Sister      Other (CABG) Brother Herb     Heart attack Brother Herb     Heart attack Sister Apoorva     Heart attack Sister Apoorva    [4]   Allergies  Allergen Reactions    Penicillins Unknown     "

## 2025-05-28 ENCOUNTER — TELEPHONE (OUTPATIENT)
Dept: PRIMARY CARE | Facility: CLINIC | Age: 65
End: 2025-05-28
Payer: MEDICARE

## 2025-05-28 DIAGNOSIS — E78.00 PURE HYPERCHOLESTEROLEMIA: ICD-10-CM

## 2025-05-28 DIAGNOSIS — I25.10 CORONARY ARTERY DISEASE INVOLVING NATIVE HEART, UNSPECIFIED VESSEL OR LESION TYPE, UNSPECIFIED WHETHER ANGINA PRESENT: Primary | ICD-10-CM

## 2025-05-28 DIAGNOSIS — D64.9 ANEMIA, UNSPECIFIED TYPE: ICD-10-CM

## 2025-05-28 DIAGNOSIS — Z13.220 SCREENING FOR HYPERLIPIDEMIA: ICD-10-CM

## 2025-05-28 DIAGNOSIS — R00.2 PALPITATIONS: ICD-10-CM

## 2025-05-30 ENCOUNTER — OFFICE VISIT (OUTPATIENT)
Dept: PRIMARY CARE | Facility: CLINIC | Age: 65
End: 2025-05-30
Payer: MEDICARE

## 2025-05-30 VITALS
HEIGHT: 66 IN | OXYGEN SATURATION: 98 % | TEMPERATURE: 97.6 F | DIASTOLIC BLOOD PRESSURE: 60 MMHG | RESPIRATION RATE: 14 BRPM | SYSTOLIC BLOOD PRESSURE: 120 MMHG | HEART RATE: 70 BPM | BODY MASS INDEX: 21.53 KG/M2 | WEIGHT: 134 LBS

## 2025-05-30 DIAGNOSIS — B00.1 COLD SORE: ICD-10-CM

## 2025-05-30 DIAGNOSIS — J30.9 ALLERGIC SINUSITIS: ICD-10-CM

## 2025-05-30 DIAGNOSIS — R51.9 CHRONIC NONINTRACTABLE HEADACHE, UNSPECIFIED HEADACHE TYPE: ICD-10-CM

## 2025-05-30 DIAGNOSIS — I47.10 SVT (SUPRAVENTRICULAR TACHYCARDIA): ICD-10-CM

## 2025-05-30 DIAGNOSIS — G89.29 CHRONIC NONINTRACTABLE HEADACHE, UNSPECIFIED HEADACHE TYPE: ICD-10-CM

## 2025-05-30 DIAGNOSIS — Z00.00 ROUTINE GENERAL MEDICAL EXAMINATION AT HEALTH CARE FACILITY: Primary | ICD-10-CM

## 2025-05-30 LAB
ALBUMIN SERPL-MCNC: 4.3 G/DL (ref 3.6–5.1)
ALP SERPL-CCNC: 68 U/L (ref 37–153)
ALT SERPL-CCNC: 14 U/L (ref 6–29)
ANION GAP SERPL CALCULATED.4IONS-SCNC: 13 MMOL/L (CALC) (ref 7–17)
AST SERPL-CCNC: 25 U/L (ref 10–35)
BILIRUB SERPL-MCNC: 0.9 MG/DL (ref 0.2–1.2)
BUN SERPL-MCNC: 14 MG/DL (ref 7–25)
CALCIUM SERPL-MCNC: 9.3 MG/DL (ref 8.6–10.4)
CHLORIDE SERPL-SCNC: 103 MMOL/L (ref 98–110)
CHOLEST SERPL-MCNC: 202 MG/DL
CHOLEST/HDLC SERPL: 2.6 (CALC)
CO2 SERPL-SCNC: 22 MMOL/L (ref 20–32)
CREAT SERPL-MCNC: 0.8 MG/DL (ref 0.5–1.05)
EGFRCR SERPLBLD CKD-EPI 2021: 81 ML/MIN/1.73M2
ERYTHROCYTE [DISTWIDTH] IN BLOOD BY AUTOMATED COUNT: 13.2 % (ref 11–15)
GLUCOSE SERPL-MCNC: 83 MG/DL (ref 65–99)
HCT VFR BLD AUTO: 41.8 % (ref 35–45)
HDLC SERPL-MCNC: 77 MG/DL
HGB BLD-MCNC: 13.6 G/DL (ref 11.7–15.5)
LDLC SERPL CALC-MCNC: 107 MG/DL (CALC)
MCH RBC QN AUTO: 32.5 PG (ref 27–33)
MCHC RBC AUTO-ENTMCNC: 32.5 G/DL (ref 32–36)
MCV RBC AUTO: 100 FL (ref 80–100)
NONHDLC SERPL-MCNC: 125 MG/DL (CALC)
PLATELET # BLD AUTO: 241 THOUSAND/UL (ref 140–400)
PMV BLD REES-ECKER: 9.8 FL (ref 7.5–12.5)
POTASSIUM SERPL-SCNC: 4.3 MMOL/L (ref 3.5–5.3)
PROT SERPL-MCNC: 6.7 G/DL (ref 6.1–8.1)
RBC # BLD AUTO: 4.18 MILLION/UL (ref 3.8–5.1)
SODIUM SERPL-SCNC: 138 MMOL/L (ref 135–146)
TRIGL SERPL-MCNC: 86 MG/DL
TSH SERPL-ACNC: 2.47 MIU/L (ref 0.4–4.5)
WBC # BLD AUTO: 4.3 THOUSAND/UL (ref 3.8–10.8)

## 2025-05-30 RX ORDER — ACYCLOVIR 400 MG/1
400 TABLET ORAL
Qty: 25 TABLET | Refills: 0 | Status: SHIPPED | OUTPATIENT
Start: 2025-05-30 | End: 2025-06-04

## 2025-05-30 ASSESSMENT — PATIENT HEALTH QUESTIONNAIRE - PHQ9
2. FEELING DOWN, DEPRESSED OR HOPELESS: NOT AT ALL
2. FEELING DOWN, DEPRESSED OR HOPELESS: SEVERAL DAYS
2. FEELING DOWN, DEPRESSED OR HOPELESS: SEVERAL DAYS
SUM OF ALL RESPONSES TO PHQ9 QUESTIONS 1 AND 2: 2
SUM OF ALL RESPONSES TO PHQ9 QUESTIONS 1 AND 2: 2
SUM OF ALL RESPONSES TO PHQ9 QUESTIONS 1 AND 2: 0
1. LITTLE INTEREST OR PLEASURE IN DOING THINGS: SEVERAL DAYS
1. LITTLE INTEREST OR PLEASURE IN DOING THINGS: SEVERAL DAYS
1. LITTLE INTEREST OR PLEASURE IN DOING THINGS: NOT AT ALL

## 2025-05-30 ASSESSMENT — ENCOUNTER SYMPTOMS
CONSTIPATION: 0
WHEEZING: 0
COUGH: 0
SHORTNESS OF BREATH: 0
ABDOMINAL PAIN: 0
DIARRHEA: 0
NAUSEA: 0

## 2025-05-30 ASSESSMENT — ACTIVITIES OF DAILY LIVING (ADL)
GROCERY_SHOPPING: INDEPENDENT
TAKING_MEDICATION: INDEPENDENT
BATHING: INDEPENDENT
DRESSING: INDEPENDENT
MANAGING_FINANCES: INDEPENDENT
DOING_HOUSEWORK: INDEPENDENT

## 2025-05-30 NOTE — PROGRESS NOTES
"Subjective   Patient ID: Savanna Shah is a 65 y.o. female who presents for Medicare Annual Wellness Visit Subsequent.    For the most part she has been feeling well.  However, just lately she has been under little more stress with her sister, who she is very close with, was diagnosed with advanced bladder cancer.   She was also seen by her cardiologist for palpitations and was diagnosed with SVT.  Upon further review, she does admit to off-and-on use of Excedrin for headaches, caffeine through the day as well as frequent use of decongestants for chronic sinus symptoms.  She denies any issues with chest pain, shortness of breath or dizzy spells.    Review of Systems   Respiratory:  Negative for cough, shortness of breath and wheezing.    Cardiovascular:  Negative for chest pain.   Gastrointestinal:  Negative for abdominal pain, constipation, diarrhea and nausea.       Objective   /60 (BP Location: Left arm, Patient Position: Sitting, BP Cuff Size: Adult)   Pulse 70   Temp 36.4 °C (97.6 °F) (Tympanic)   Resp 14   Ht 1.67 m (5' 5.75\")   Wt 60.8 kg (134 lb)   SpO2 98%   BMI 21.79 kg/m²     Physical Exam  Vitals reviewed.   Constitutional:       Appearance: Normal appearance.   HENT:      Head: Normocephalic.   Eyes:      Pupils: Pupils are equal, round, and reactive to light.   Cardiovascular:      Rate and Rhythm: Normal rate and regular rhythm.   Pulmonary:      Effort: Pulmonary effort is normal.      Breath sounds: Normal breath sounds.   Musculoskeletal:         General: Normal range of motion.   Neurological:      General: No focal deficit present.      Mental Status: She is alert.   Psychiatric:         Mood and Affect: Mood normal.         Assessment/Plan   Problem List Items Addressed This Visit           ICD-10-CM    Cold sore B00.1    Relevant Medications    acyclovir (Zovirax) 400 mg tablet     Other Visit Diagnoses         Codes      Routine general medical examination at [facility] " facility    -  Primary Z00.00    Relevant Orders    Urinalysis with Reflex Microscopic (Completed)      SVT (supraventricular tachycardia)     I47.10      Allergic sinusitis     J30.9      Chronic nonintractable headache, unspecified headache type     R51.9, G89.29        Reviewed and discussed the above.  History of SVT.  We stressed the need to avoid caffeine and certainly avoid Excedrin and decongestant use.  For her chronic sinus symptoms we discussed Flonase Sensimist along with Claritin on a daily basis.  On times of extra congesting she could take plain Mucinex, but again no decongestant.  She was given metoprolol by her heart doctor but she does have a rather slow heart rate to begin with.  Will see if we can manage her symptoms with avoidance of caffeine and decongestants as well as staying well-hydrated.  If symptoms do return we discussed starting metoprolol half a tablet daily.  We did review and discuss her current medication as well as her most recent blood test.  We did discuss unfortunate news about her sister.  She feels she is handling things as well as possible considering how recent this news has been.  We did discuss the benefits of healthy diet, regular exercise as well as regular sleep routines.  If symptoms do worsen she can always return for further discussion and possible medications.  Annual Wellness Visit questions and answers were reviewed and discussed including the importance of discussing end of life wishes as well as having a living will and health care power of .

## 2025-05-31 LAB
APPEARANCE UR: CLEAR
BILIRUB UR QL STRIP: NEGATIVE
COLOR UR: YELLOW
GLUCOSE UR QL STRIP: NEGATIVE
HGB UR QL STRIP: NEGATIVE
KETONES UR QL STRIP: ABNORMAL
LEUKOCYTE ESTERASE UR QL STRIP: NEGATIVE
NITRITE UR QL STRIP: NEGATIVE
PH UR STRIP: 7 [PH] (ref 5–8)
PROT UR QL STRIP: NEGATIVE
SP GR UR STRIP: 1.02 (ref 1–1.03)

## 2025-11-17 ENCOUNTER — APPOINTMENT (OUTPATIENT)
Dept: CARDIOLOGY | Facility: CLINIC | Age: 65
End: 2025-11-17
Payer: MEDICARE

## 2025-11-21 ENCOUNTER — APPOINTMENT (OUTPATIENT)
Dept: PRIMARY CARE | Facility: CLINIC | Age: 65
End: 2025-11-21
Payer: MEDICARE

## (undated) DEVICE — SOLUTION, IRRIGATION, SODIUM CHLORIDE 0.9%, 1000 ML, POUR BOTTLE

## (undated) DEVICE — SUTURE, ETHILON, 4-0, 18 IN, P-3, BLACK

## (undated) DEVICE — GOWN, SURGICAL, SMARTGOWN, XX-LARGE, STERILE

## (undated) DEVICE — MARKER, SKIN, DUAL TIP, W/RULER AND LABEL

## (undated) DEVICE — PREP, SKIN, BACTOSHEILD, 4%, 4OZ

## (undated) DEVICE — NEEDLE, HYPODERMIC, MONOJECT, 27 G X 1.5 IN

## (undated) DEVICE — Device

## (undated) DEVICE — NEEDLE, ELECTRODE, ELECTROSURGICAL, INSULATED

## (undated) DEVICE — DRAPE, TOWEL, STERI DRAPE, 17 X 11 IN, PLASTIC, STERILE

## (undated) DEVICE — GLOVES, SURG BIOGEL, SZ-7.5, PF, LF

## (undated) DEVICE — DRESSING, GAUZE, PETROLATUM, PATCH, XEROFORM, 1 X 8 IN, STERILE

## (undated) DEVICE — TOWEL PACK, STERILE, 4/PACK, BLUE

## (undated) DEVICE — SOLUTION, IRRIGATION, STERILE WATER, 1000 ML, POUR BOTTLE

## (undated) DEVICE — BANDAGE, GAUZE, 6 PLY, KERLIX, 2.25 IN X 3 YD, STERILE

## (undated) DEVICE — CAUTERY, PENCIL, PUSH BUTTON, SMOKE EVAC, 70MM